# Patient Record
Sex: FEMALE | Race: WHITE | Employment: FULL TIME | URBAN - METROPOLITAN AREA
[De-identification: names, ages, dates, MRNs, and addresses within clinical notes are randomized per-mention and may not be internally consistent; named-entity substitution may affect disease eponyms.]

---

## 2017-04-19 ENCOUNTER — MYC MEDICAL ADVICE (OUTPATIENT)
Dept: FAMILY MEDICINE | Facility: CLINIC | Age: 47
End: 2017-04-19

## 2017-04-19 DIAGNOSIS — F41.1 GENERALIZED ANXIETY DISORDER: Primary | ICD-10-CM

## 2017-04-20 RX ORDER — CITALOPRAM HYDROBROMIDE 20 MG/1
TABLET ORAL
Qty: 90 TABLET | Refills: 1 | Status: SHIPPED | OUTPATIENT
Start: 2017-04-20 | End: 2017-09-12 | Stop reason: ALTCHOICE

## 2017-06-12 DIAGNOSIS — J33.9 NASAL POLYPOSIS: ICD-10-CM

## 2017-06-12 DIAGNOSIS — J32.4 CHRONIC PANSINUSITIS: ICD-10-CM

## 2017-06-12 RX ORDER — AZITHROMYCIN 250 MG/1
TABLET, FILM COATED ORAL
Qty: 6 TABLET | Refills: 3 | Status: SHIPPED | OUTPATIENT
Start: 2017-06-12 | End: 2017-09-12

## 2017-06-12 NOTE — TELEPHONE ENCOUNTER
Pending Prescriptions:                       Disp   Refills    azithromycin (ZITHROMAX) 250 MG tablet    6 tabl*3            Sig: Two tablets first day, then one tablet daily for           four days.

## 2017-09-12 ENCOUNTER — OFFICE VISIT (OUTPATIENT)
Dept: FAMILY MEDICINE | Facility: CLINIC | Age: 47
End: 2017-09-12
Payer: COMMERCIAL

## 2017-09-12 VITALS
DIASTOLIC BLOOD PRESSURE: 83 MMHG | SYSTOLIC BLOOD PRESSURE: 126 MMHG | OXYGEN SATURATION: 100 % | TEMPERATURE: 98 F | HEART RATE: 82 BPM | BODY MASS INDEX: 25.23 KG/M2 | WEIGHT: 147 LBS

## 2017-09-12 DIAGNOSIS — J01.01 ACUTE RECURRENT MAXILLARY SINUSITIS: ICD-10-CM

## 2017-09-12 DIAGNOSIS — R07.0 THROAT PAIN: ICD-10-CM

## 2017-09-12 DIAGNOSIS — F41.1 GENERALIZED ANXIETY DISORDER: Primary | ICD-10-CM

## 2017-09-12 DIAGNOSIS — Z23 NEED FOR PROPHYLACTIC VACCINATION AND INOCULATION AGAINST INFLUENZA: ICD-10-CM

## 2017-09-12 LAB
DEPRECATED S PYO AG THROAT QL EIA: NORMAL
SPECIMEN SOURCE: NORMAL

## 2017-09-12 PROCEDURE — 87081 CULTURE SCREEN ONLY: CPT | Performed by: PHYSICIAN ASSISTANT

## 2017-09-12 PROCEDURE — 90471 IMMUNIZATION ADMIN: CPT | Performed by: PHYSICIAN ASSISTANT

## 2017-09-12 PROCEDURE — 87880 STREP A ASSAY W/OPTIC: CPT | Performed by: PHYSICIAN ASSISTANT

## 2017-09-12 PROCEDURE — 90686 IIV4 VACC NO PRSV 0.5 ML IM: CPT | Performed by: PHYSICIAN ASSISTANT

## 2017-09-12 PROCEDURE — 99213 OFFICE O/P EST LOW 20 MIN: CPT | Mod: 25 | Performed by: PHYSICIAN ASSISTANT

## 2017-09-12 RX ORDER — AZITHROMYCIN 250 MG/1
TABLET, FILM COATED ORAL
Qty: 6 TABLET | Refills: 0 | Status: SHIPPED | OUTPATIENT
Start: 2017-09-12 | End: 2017-12-11

## 2017-09-12 RX ORDER — ATENOLOL 25 MG/1
TABLET ORAL
Qty: 30 TABLET | Refills: 1 | Status: SHIPPED | OUTPATIENT
Start: 2017-09-12 | End: 2017-09-26

## 2017-09-12 ASSESSMENT — ANXIETY QUESTIONNAIRES
1. FEELING NERVOUS, ANXIOUS, OR ON EDGE: NEARLY EVERY DAY
GAD7 TOTAL SCORE: 15
IF YOU CHECKED OFF ANY PROBLEMS ON THIS QUESTIONNAIRE, HOW DIFFICULT HAVE THESE PROBLEMS MADE IT FOR YOU TO DO YOUR WORK, TAKE CARE OF THINGS AT HOME, OR GET ALONG WITH OTHER PEOPLE: VERY DIFFICULT
7. FEELING AFRAID AS IF SOMETHING AWFUL MIGHT HAPPEN: SEVERAL DAYS
2. NOT BEING ABLE TO STOP OR CONTROL WORRYING: SEVERAL DAYS
3. WORRYING TOO MUCH ABOUT DIFFERENT THINGS: MORE THAN HALF THE DAYS
6. BECOMING EASILY ANNOYED OR IRRITABLE: NEARLY EVERY DAY
5. BEING SO RESTLESS THAT IT IS HARD TO SIT STILL: MORE THAN HALF THE DAYS

## 2017-09-12 ASSESSMENT — PATIENT HEALTH QUESTIONNAIRE - PHQ9
5. POOR APPETITE OR OVEREATING: NEARLY EVERY DAY
SUM OF ALL RESPONSES TO PHQ QUESTIONS 1-9: 5

## 2017-09-12 NOTE — Clinical Note
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 Kristen Kehr PA-C

## 2017-09-12 NOTE — MR AVS SNAPSHOT
After Visit Summary   9/12/2017    Annabelle Greene    MRN: [id number]           Patient Information     Date Of Birth          1970        Visit Information        Provider Department      9/12/2017 8:40 AM Kehr, Kristen M, PA-C [facility]        Today's Diagnoses     Throat pain    -  1    Generalized anxiety disorder           Follow-ups after your visit        Who to contact     If you have questions or need follow up information about today's clinic visit or your schedule please contact [facility] directly at [phone].  Normal or non-critical lab and imaging results will be communicated to you by [employer], letter or phone within 4 business days after the clinic has received the results. If you do not hear from us within 7 days, please contact the clinic through Storwizet or phone. If you have a critical or abnormal lab result, we will notify you by phone as soon as possible.  Submit refill requests through Solar[employer] or call your pharmacy and they will forward the refill request to us. Please allow 3 business days for your refill to be completed.          Additional Information About Your Visit        MyChart Information     [employer] gives you secure access to your electronic health record. If you see a primary care provider, you can also send messages to your care team and make appointments. If you have questions, please call your primary care clinic.  If you do not have a primary care provider, please call [phone] and they will assist you.        Care EveryWhere ID     This is your Care EveryWhere ID. This could be used by other organizations to access your [facility] medical records  [id number]        Your Vitals Were     Pulse Temperature Pulse Oximetry BMI (Body Mass Index)          82 98  F (36.7  C) (Oral) 100% 25.23 kg/m2         Blood Pressure from Last 3 Encounters:   09/12/17 126/83   08/26/16 124/85   08/11/16 131/81    Weight from Last 3 Encounters:    09/12/17 147 lb (66.7 kg)   09/13/16 143 lb (64.9 kg)   08/30/16 143 lb (64.9 kg)              We Performed the Following     Beta strep group A culture     Strep, Rapid Screen          Today's Medication Changes          These changes are accurate as of: 9/12/17  9:05 AM.  If you have any questions, ask your nurse or doctor.               Start taking these medicines.        Dose/Directions    sertraline 50 MG tablet   Commonly known as:  ZOLOFT   Used for:  Generalized anxiety disorder   Started by:  Kehr, Kristen M, PA-C        Dose:  50 mg   Take 1 tablet (50 mg) by mouth daily   Quantity:  90 tablet   Refills:  1            Where to get your medicines      These medications were sent to [id number] [address] [place] [place], [facility] [address]     Phone:  [phone]     atenolol 25 MG tablet    sertraline 50 MG tablet                Primary Care Provider Office Phone # Fax #    Kristen M Kehr, PA-C [phone] [address] [facility] [address]        Equal Access to Services     JACKELINE FRASER : Hadii garrett harris hadasho Soomaali, waaxda luqadaha, qaybta kaalmada adeegyada, marek guerra. So [facility] [phone].    ATENCIÓN: Si habla español, tiene a grijalva disposición servicios gratuitos de asistencia lingüística. Llame al [phone].    We comply with applicable federal civil rights laws and [place] laws. We do not discriminate on the basis of race, color, national origin, age, disability sex, sexual orientation or gender identity.            Thank you!     Thank you for choosing [facility]  for your care. Our goal is always to provide you with excellent care. Hearing back from our patients is one way we can continue to improve our services. Please take a few minutes to complete the written survey that you may receive in the mail after your visit with us.  Thank you!             Your Updated Medication List - Protect others around you: Learn how to safely use, store and throw away your medicines at www.disposemymeds.org.          This list is accurate as of: 9/12/17  9:05 AM.  Always use your most recent med list.                   Brand Name Dispense Instructions for use Diagnosis    atenolol 25 MG tablet    TENORMIN    30 tablet    1/2 tablet 15-20 minutes prior to public speaking    Generalized anxiety disorder       citalopram 20 MG tablet    celeXA    90 tablet    Take 1/2 tablet (10 mg) for 1-2 weeks, then increase to 1 tablet orally daily    Generalized anxiety disorder       sertraline 50 MG tablet    ZOLOFT    90 tablet    Take 1 tablet (50 mg) by mouth daily    Generalized anxiety disorder

## 2017-09-12 NOTE — NURSING NOTE
"Chief Complaint   Patient presents with     Anxiety     Sinus Problem     possible sinus infection       Initial /83  Pulse 82  Temp 98  F (36.7  C) (Oral)  Wt 147 lb (66.7 kg)  SpO2 100%  BMI 25.23 kg/m2 Estimated body mass index is 25.23 kg/(m^2) as calculated from the following:    Height as of 9/13/16: 5' 4\" (1.626 m).    Weight as of this encounter: 147 lb (66.7 kg).  Medication Reconciliation: complete    CALEB Daigle MA    "

## 2017-09-12 NOTE — PROGRESS NOTES
SUBJECTIVE:   Annabelle Greene is a 47 year old female who presents to clinic today for the following health issues:    Annabelle is here today for anxiety. She is on the citalopram 20 mg daily and it has not been very effective for her. 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 She is using the atenolol, but it has not always been very effective. She is interested in counseling.     She also has a sinus infection: see below for symptoms.     Anxiety Follow-Up    Status since last visit: Worsened     Other associated symptoms:None    Complicating factors:   Significant life event: Yes-     Current substance abuse: None  Depression symptoms: No  IMER-7 SCORE 4/1/2015 7/2/2015 5/18/2016   Total Score 2 3 -   Total Score - - 5       GAD7      Amount of exercise or physical activity:     Problems taking medications regularly: No    Medication side effects: none  Diet:       ENT Symptoms             Symptoms: cc Present Absent Comment   Fever/Chills   x    Fatigue  x     Muscle Aches  x     Eye Irritation  x     Sneezing  x     Nasal Jenaro/Drg  x     Sinus Pressure/Pain  x     Loss of smell  x     Dental pain  x     Sore Throat  x     Swollen Glands  x     Ear Pain/Fullness  x     Cough  x     Wheeze   x    Chest Pain   x    Shortness of breath   x    Rash   x    Other  x  Headaches      Symptom duration:  off and on x couple of weeks   Symptom severity:  severe   Treatments tried:  otc-dyquil   Contacts:  possible work           Problem list and histories reviewed & adjusted, as indicated.  Additional history: as documented    Patient Active Problem List   Diagnosis     Nasal polyposis     Chronic sinus infection     CARDIOVASCULAR SCREENING; LDL GOAL LESS THAN 160     Generalized anxiety disorder     Past Surgical History:   Procedure Laterality Date     GENITOURINARY SURGERY      c section x 2     HYSTERECTOMY       HYSTERECTOMY, PAP NO LONGER INDICATED       OPTICAL TRACKING SYSTEM  ENDOSCOPIC SINUS SURGERY Bilateral 8/26/2016    Procedure: OPTICAL TRACKING SYSTEM ENDOSCOPIC SINUS SURGERY;  Surgeon: Govind Chester MD;  Location: [facility] OR     SINUS SURGERY      sinus x 4     SURGICAL HISTORY OF -   4/10/08    SINUS SURGERY     TONSILLECTOMY         Social History   Substance Use Topics     Smoking status: Never Smoker     Smokeless tobacco: Never Used     Alcohol use Yes      Comment: occ. 2 drinks a week     Family History   Problem Relation Age of Onset     Hypertension Mother          Allergies   Allergen Reactions     Sulfa Drugs Hives         Reviewed and updated as needed this visit by clinical staffTobacco  Allergies  Meds  Med Hx  Surg Hx  Fam Hx  Soc Hx      Reviewed and updated as needed this visit by Provider         ROS:  Constitutional, HEENT, cardiovascular, pulmonary, gi and gu systems are negative, except as otherwise noted.      OBJECTIVE:   /83  Pulse 82  Temp 98  F (36.7  C) (Oral)  Wt 147 lb (66.7 kg)  SpO2 100%  BMI 25.23 kg/m2  Body mass index is 25.23 kg/(m^2).  GENERAL: healthy, alert and no distress  MS: no gross musculoskeletal defects noted, no edema  SKIN: no suspicious lesions or rashes  PSYCH: mentation appears normal, affect normal/bright, judgement and insight intact and appearance well groomed    Diagnostic Test Results:  none     ASSESSMENT/PLAN:       1. Generalized anxiety disorder  Change to sertraline, stop the cilatopram.   Side effects and how to take the medication discussed.  I will contact Dee here in [facility] to help with scheduling counseling.   Atenolol dose to increase to 1 tablet prior to speaking engagements.   - atenolol (TENORMIN) 25 MG tablet; 1/2 tablet 15-20 minutes prior to public speaking  Dispense: 30 tablet; Refill: 1  - sertraline (ZOLOFT) 50 MG tablet; Take 1 tablet (50 mg) by mouth daily  Dispense: 90 tablet; Refill: 1    2. Acute recurrent maxillary sinusitis  Strep test is negative.    Continue with symptomatic treatments with OTC medications also, rest and fluids.   zpak to treat infection  - azithromycin (ZITHROMAX) 250 MG tablet; Two tablets first day, then one tablet daily for four days.  Dispense: 6 tablet; Refill: 0    3. Need for prophylactic vaccination and inoculation against influenza  - Vaccine Administration, Initial [79423]  - FLU VAC, SPLIT VIRUS IM > 3 YO (QUADRIVALENT) [70934]        Kristen M. Kehr, PA-C  [facility]  Injectable Influenza Immunization Documentation    1.  Are you sick today? (Fever of 100.5 or higher on the day of the clinic)      2.  Have you ever had Guillain-Timberon Syndrome within 6 weeks of an influenza vaccionation?     3. Do you have a life-threatening allergy to eggs?     4. Do you have a life-threatening allergy to a component of the vaccine? May include antibiotics, gelatin or latex.      5. Have you ever had a reaction to a dose of flu vaccine that needed immediate medical attention?      Form completed by Rigoberto SHORT MA

## 2017-09-13 ENCOUNTER — TELEPHONE (OUTPATIENT)
Dept: FAMILY MEDICINE | Facility: CLINIC | Age: 47
End: 2017-09-13

## 2017-09-13 ENCOUNTER — MYC MEDICAL ADVICE (OUTPATIENT)
Dept: FAMILY MEDICINE | Facility: CLINIC | Age: 47
End: 2017-09-13

## 2017-09-13 DIAGNOSIS — F41.1 GENERALIZED ANXIETY DISORDER: ICD-10-CM

## 2017-09-13 DIAGNOSIS — B00.1 RECURRENT COLD SORES: Primary | ICD-10-CM

## 2017-09-13 LAB
BACTERIA SPEC CULT: NORMAL
SPECIMEN SOURCE: NORMAL

## 2017-09-13 ASSESSMENT — ANXIETY QUESTIONNAIRES: GAD7 TOTAL SCORE: 15

## 2017-09-13 NOTE — TELEPHONE ENCOUNTER
Pharmacy note: this medication is currently backordered, with unknown release date. Would you consider change to alternative med (ex. Metoprolol)? If so, please send new Rx. Thanks      Rigoberto SHORT MA

## 2017-09-14 RX ORDER — VALACYCLOVIR HYDROCHLORIDE 500 MG/1
500 TABLET, FILM COATED ORAL 2 TIMES DAILY
Qty: 30 TABLET | Refills: 3 | Status: SHIPPED | OUTPATIENT
Start: 2017-09-14 | End: 2019-03-30

## 2017-09-21 NOTE — TELEPHONE ENCOUNTER
Information below is reviewed with  Kristen Kehr, PA-C, can offer patient Atenolol 25 mg dosing, then can  Atenolol 50 mg tablets at [facility] pharmacy and take 1/2 tablet prior to speaking engagements.  If patient prefers to use her own pharmacy can prescribe Propanolol 20 mg 1 tab 30 minutes prior to speaking engagement.   Left message on answering machine for patient/parent to call back.   [phone].  Stefani Noriega RN

## 2017-09-22 NOTE — TELEPHONE ENCOUNTER
Left message on answering machine for patient/parent to call back.   [phone].  Stefani Noriega RN

## 2017-09-25 ENCOUNTER — TELEPHONE (OUTPATIENT)
Dept: OTOLARYNGOLOGY | Facility: CLINIC | Age: 47
End: 2017-09-25

## 2017-09-25 NOTE — TELEPHONE ENCOUNTER
Left message on answering machine for patient/parent to call back.   [phone].  Stefani Noriega RN

## 2017-09-25 NOTE — TELEPHONE ENCOUNTER
LIZZETTEM requesting that patient call clinic to assist with scheduling. Dr. Chester is only in clinic on 9/26/17 this week due to vacation. He does not have any openings on 9/26 but the other ENT providers have availability to see patient this week.    Shannon Perez, MARIPOSA

## 2017-09-25 NOTE — TELEPHONE ENCOUNTER
Pt would like to be seen this week at either location.  Please call patient to advise.        Thank you,   Nigel BARCENAS   Central Scheduling  [phone]

## 2017-09-26 ENCOUNTER — TELEPHONE (OUTPATIENT)
Dept: BEHAVIORAL HEALTH | Facility: CLINIC | Age: 47
End: 2017-09-26

## 2017-09-26 RX ORDER — ATENOLOL 25 MG/1
TABLET ORAL
Qty: 30 TABLET | Refills: 1 | OUTPATIENT
Start: 2017-09-26

## 2017-09-26 RX ORDER — PROPRANOLOL HYDROCHLORIDE 20 MG/1
TABLET ORAL
Qty: 60 TABLET | Refills: 1 | Status: SHIPPED | OUTPATIENT
Start: 2017-09-26 | End: 2019-03-30

## 2017-09-26 NOTE — TELEPHONE ENCOUNTER
Patient returned call, patient prefers to try the Propranolol.  New prescription is sent to pharmacy with instructions.   The patient/parent agrees with the plan and verbalized good understanding.    Per protocol, will route encounter to be cosigned by provider for Verbal Orders.  Stefani Noriega RN

## 2017-09-26 NOTE — TELEPHONE ENCOUNTER
Phone Encounter   Left message introducing [facility] and therapy services urged patient to call clinic if interested in additional support services.

## 2017-09-26 NOTE — TELEPHONE ENCOUNTER
Patient has not returned call after multiple attempts.  Denial sent to pharmacy to have patient call clinic to determine plan for alternate medication.    Per protocol, will route encounter to be cosigned by provider for Verbal Orders.  Stefani Noriega RN

## 2017-09-29 ENCOUNTER — OFFICE VISIT (OUTPATIENT)
Dept: OTOLARYNGOLOGY | Facility: CLINIC | Age: 47
End: 2017-09-29
Payer: COMMERCIAL

## 2017-09-29 VITALS — RESPIRATION RATE: 14 BRPM | WEIGHT: 147 LBS | HEIGHT: 64 IN | BODY MASS INDEX: 25.1 KG/M2

## 2017-09-29 DIAGNOSIS — J32.4 CHRONIC PANSINUSITIS: Primary | ICD-10-CM

## 2017-09-29 DIAGNOSIS — J33.9 NASAL POLYP: ICD-10-CM

## 2017-09-29 PROCEDURE — 99214 OFFICE O/P EST MOD 30 MIN: CPT | Mod: 25 | Performed by: OTOLARYNGOLOGY

## 2017-09-29 PROCEDURE — 31231 NASAL ENDOSCOPY DX: CPT | Performed by: OTOLARYNGOLOGY

## 2017-09-29 RX ORDER — PREDNISONE 20 MG/1
TABLET ORAL
Qty: 20 TABLET | Refills: 0 | Status: SHIPPED | OUTPATIENT
Start: 2017-09-29 | End: 2018-02-27

## 2017-09-29 RX ORDER — FLUTICASONE PROPIONATE 50 MCG
2 SPRAY, SUSPENSION (ML) NASAL DAILY
Qty: 1 BOTTLE | Refills: 11 | Status: SHIPPED | OUTPATIENT
Start: 2017-09-29 | End: 2018-02-27

## 2017-09-29 NOTE — NURSING NOTE
"Chief Complaint   Patient presents with     Consult     Sinus       Initial Resp 14  Ht 1.626 m (5' 4\")  Wt 66.7 kg (147 lb)  BMI 25.23 kg/m2 Estimated body mass index is 25.23 kg/(m^2) as calculated from the following:    Height as of this encounter: 1.626 m (5' 4\").    Weight as of this encounter: 66.7 kg (147 lb).  Medication Reconciliation: complete   Juli Pineda CMA 9/29/2017 1:08 PM      "

## 2017-09-29 NOTE — MR AVS SNAPSHOT
After Visit Summary   9/29/2017    Annabelle Greene    MRN: [id number]           Patient Information     Date Of Birth          1970        Visit Information        Provider Department      9/29/2017 1:00 PM Gulshan Crockett MD [facility]        Today's Diagnoses     Chronic pansinusitis    -  1    Nasal polyp          Care Instructions    General Scheduling Information  To schedule your CT/MRI scan, please contact [place] [facility] at [phone] [address] [place] [address]    To schedule your Surgery, please contact our Specialty Schedulers at [phone]    ENT Clinic Locations Clinic Hours Telephone Number     [facility]  [address]  [place] [address]   Tuesday:       8:00am -- 4:00pm    Wednesday:  8:00am - 4:00pm   To schedule an appointment with   Dr. Crockett,   please contact our   Specialty Scheduling Department at:     [phone]       [facility]  [address] [place] [address]   Friday:          8:00am - 4:00pm         Urgent Care Locations Clinic Hours Telephone Numbers     [facility]  [address]  [place] [address]     Monday-Friday:     11:00pm - 9:00pm    Saturday-Sunday:  9:00am - 5:00pm   [phone]     [facility]  [address]   [place] [address]     Monday-Friday:      5:00pm - 9:00pm     Saturday-Sunday:  9:00am - 5:00pm   [phone]               Follow-ups after your visit        Who to contact     If you have questions or need follow up information about today's clinic visit or your schedule please contact [facility] directly at [phone].  Normal or non-critical lab and imaging results will be communicated to you by MyChart, letter or phone within 4 business days after the clinic has received the results. If you do not hear from us within 7 days, please contact the clinic through MyChart or phone. If you have a critical or abnormal lab result, we  "will notify you by phone as soon as possible.  Submit refill requests through [employer] or call your pharmacy and they will forward the refill request to us. Please allow 3 business days for your refill to be completed.          Additional Information About Your Visit        Secur[employer]hart Information     [employer] gives you secure access to your electronic health record. If you see a primary care provider, you can also send messages to your care team and make appointments. If you have questions, please call your primary care clinic.  If you do not have a primary care provider, please call [phone] and they will assist you.        Care EveryWhere ID     This is your Care EveryWhere ID. This could be used by other organizations to access your [place] medical records  [id number]        Your Vitals Were     Respirations Height BMI (Body Mass Index)             14 1.626 m (5' 4\") 25.23 kg/m2          Blood Pressure from Last 3 Encounters:   09/12/17 126/83   08/26/16 124/85   08/11/16 131/81    Weight from Last 3 Encounters:   09/29/17 66.7 kg (147 lb)   09/12/17 66.7 kg (147 lb)   09/13/16 64.9 kg (143 lb)              We Performed the Following     Nasal Endoscopy, Diag          Today's Medication Changes          These changes are accurate as of: 9/29/17  5:44 PM.  If you have any questions, ask your nurse or doctor.               Start taking these medicines.        Dose/Directions    fluticasone 50 MCG/ACT spray   Commonly known as:  FLONASE   Used for:  Chronic pansinusitis, Nasal polyp   Started by:  Gulshan Crockett MD        Dose:  2 spray   Spray 2 sprays into both nostrils daily   Quantity:  1 Bottle   Refills:  11       predniSONE 20 MG tablet   Commonly known as:  DELTASONE   Used for:  Chronic pansinusitis, Nasal polyp   Started by:  Gulshan Crockett MD        Take 60 mg daily for 3 days, 40 mg daily for 3 days, 20 mg daily for 3 days, 10 mg daily for 3 days   Quantity:  20 tablet   Refills:  0          "   Where to get your medicines      These medications were sent to [id number] - [facility] [address] AT [address]  [address], [facility] [address]     Phone:  [phone]     fluticasone 50 MCG/ACT spray    predniSONE 20 MG tablet                Primary Care Provider Office Phone # Fax #    Kristen M Kehr, PA-C [phone] [address] [facility] [address]        Equal Access to Services     [facility]: Hadii aad ku hadasho Soomaali, waaxda luqadaha, qaybta kaalmada adeegyada, waxcarmina martel hayjuliette waller . So [facility] [place] [phone].    ATENCIÓN: Si habla español, tiene a grijalva disposición servicios gratuitos de asistencia lingüística. [facility] [phone].    We comply with applicable federal civil rights laws and [place] laws. We do not discriminate on the basis of race, color, national origin, age, disability, sex, sexual orientation, or gender identity.            Thank you!     Thank you for choosing [facility]  for your care. Our goal is always to provide you with excellent care. Hearing back from our patients is one way we can continue to improve our services. Please take a few minutes to complete the written survey that you may receive in the mail after your visit with us. Thank you!             Your Updated Medication List - Protect others around you: Learn how to safely use, store and throw away your medicines at www.disposemymeds.org.          This list is accurate as of: 9/29/17  5:44 PM.  Always use your most recent med list.                   Brand Name Dispense Instructions for use Diagnosis    azithromycin 250 MG tablet    ZITHROMAX    6 tablet    Two tablets first day, then one tablet daily for four days.    Acute recurrent maxillary sinusitis       fluticasone 50 MCG/ACT spray    FLONASE    1 Bottle    Spray 2 sprays into both nostrils daily    Chronic pansinusitis, Nasal polyp        predniSONE 20 MG tablet    DELTASONE    20 tablet    Take 60 mg daily for 3 days, 40 mg daily for 3 days, 20 mg daily for 3 days, 10 mg daily for 3 days    Chronic pansinusitis, Nasal polyp       propranolol 20 MG tablet    INDERAL    60 tablet    Take 1 tablet 30 min prior to anxiety provoking event    Generalized anxiety disorder       sertraline 50 MG tablet    ZOLOFT    90 tablet    Take 1 tablet (50 mg) by mouth daily    Generalized anxiety disorder       valACYclovir 500 MG tablet    VALTREX    30 tablet    Take 1 tablet (500 mg) by mouth 2 times daily    Recurrent cold sores

## 2017-09-29 NOTE — PATIENT INSTRUCTIONS
General Scheduling Information  To schedule your CT/MRI scan, please contact [place] [facility] at [phone]   [address] [place] [address]    To schedule your Surgery, please contact our Specialty Schedulers at [phone]    ENT Clinic Locations Clinic Hours Telephone Number     [facility]  [address] [place] [address]   Tuesday:       8:00am -- 4:00pm    Wednesday:  8:00am - 4:00pm   To schedule an appointment with   Dr. Crockett,   please contact our   Specialty Scheduling Department at:     [phone]       [facility]  [address] [place] [address]   Friday:          8:00am - 4:00pm         Urgent Care Locations Clinic Hours Telephone Numbers     [facility]  [address] [place] [address]     Monday-Friday:     11:00pm - 9:00pm    Saturday-Sunday:  9:00am - 5:00pm   [phone]     [facility]  [address] [place] [address]     Monday-Friday:      5:00pm - 9:00pm     Saturday-Sunday:  9:00am - 5:00pm   [phone]

## 2017-09-29 NOTE — PROGRESS NOTES
Chief Complaint - sinusitis    History of Present Illness - Annabelle Greene is a 47 year old female who presents for evaluation of chronic sinusitis. Patient of Dr. Hagen Has had sinus surgery in the past, most recently 8/26/2016. The patient describes symptoms of forehead pain, yellow and green drainage for the past 1-2 months. Treatments within the last few months have included z-pac, nasal irrigations, and oral antihistamines. Headache bothers her the most. Poor sense of smell. Principal at [employer].     Past Medical History -   Patient Active Problem List   Diagnosis     Nasal polyposis     Chronic sinus infection     CARDIOVASCULAR SCREENING; LDL GOAL LESS THAN 160     Generalized anxiety disorder       Current Medications -   Current Outpatient Prescriptions:      propranolol (INDERAL) 20 MG tablet, Take 1 tablet 30 min prior to anxiety provoking event, Disp: 60 tablet, Rfl: 1     valACYclovir (VALTREX) 500 MG tablet, Take 1 tablet (500 mg) by mouth 2 times daily, Disp: 30 tablet, Rfl: 3     sertraline (ZOLOFT) 50 MG tablet, Take 1 tablet (50 mg) by mouth daily, Disp: 90 tablet, Rfl: 1     azithromycin (ZITHROMAX) 250 MG tablet, Two tablets first day, then one tablet daily for four days., Disp: 6 tablet, Rfl: 0    Allergies -   Allergies   Allergen Reactions     Sulfa Drugs Hives       Social History -   Social History     Social History     Marital status:      Spouse name: N/A     Number of children: N/A     Years of education: N/A     Occupational History      [employer]     Social History Main Topics     Smoking status: Never Smoker     Smokeless tobacco: Never Used     Alcohol use Yes      Comment: occ. 2 drinks a week     Drug use: No     Sexual activity: Yes     Partners: Male     Birth control/ protection: IUD     Other Topics Concern     None     Social History Narrative       Family History -   Family History   Problem Relation Age of Onset     Hypertension  "Mother        Review of Systems - As per HPI and PMHx, otherwise 7 system review of the head and neck negative.    Physical Exam  Resp 14  Ht 1.626 m (5' 4\")  Wt 66.7 kg (147 lb)  BMI 25.23 kg/m2  General - The patient is nontoxic, in no distress. Alert and oriented to person and place, answers questions and cooperates with examination appropriately.   Neurologic - CN II-XII are intact. No focal neurologic deficits.   Voice and Breathing - The patient was breathing comfortably without the use of accessory muscles. There was no wheezing, stridor, or stertor.  The patients voice was clear and strong.  Eyes - Extraocular movements intact.  Sclera were not icteric or injected, conjunctiva were pink and moist.  Mouth - Examination of the oral cavity showed pink, healthy oral mucosa. No lesions or ulcerations noted.  The tongue was mobile and midline.  Throat - The walls of the oropharynx were smooth, symmetric, and had no lesions or ulcerations.  No postnasal drainage.  The uvula was midline on elevation.  Nose - External contour is symmetric, no gross deflection or scars.  Nasal mucosa is pink and moist with no abnormal mucus.  The septum was midline and non-obstructive, no pus. Ethmoids open.   Neck - Palpation of the occipital, submental, submandibular, internal jugular chain, and supraclavicular nodes did not demonstrate any abnormal lymph nodes or masses. No parotid masses. Palpation of the thyroid was soft and smooth, with no nodules or goiter appreciated.  The trachea was mobile and midline.    Performed flexible nasal endoscopy with peds flex scope. Sprayed both nasal cavities with phenylephrine and lidocaine. Examined the right nasal cavity with scope. Open ethmoids and maxillary. Mild polypoid changes up high by frontal outflow, but no pus. Maxillary had no pus, some edema. Sphenoid open and clear. Middle turbinate partially removed. Left side shows open frontal and maxillary and ethmoids. No pus and no " polyps. Septum straight.       A/P - Annabelle VANESSA Greene is a 47 year old female with chronic sinusitis.. Has been having headaches and pressure. Could be her right frontal closed off as she has high polypoid changes. No pus. Overall the sinuses looked good. I advised prednisone and flonase and nasal saline irrigations.       Gulshan Crockett MD  Otolaryngology  [facility]

## 2017-10-17 ENCOUNTER — RADIANT APPOINTMENT (OUTPATIENT)
Dept: MRI IMAGING | Facility: CLINIC | Age: 47
End: 2017-10-17
Attending: PHYSICIAN ASSISTANT
Payer: COMMERCIAL

## 2017-10-17 DIAGNOSIS — M54.12 CERVICAL RADICULOPATHY: ICD-10-CM

## 2017-10-17 PROCEDURE — 72141 MRI NECK SPINE W/O DYE: CPT | Mod: TC

## 2017-10-30 DIAGNOSIS — F41.1 GENERALIZED ANXIETY DISORDER: ICD-10-CM

## 2017-10-31 RX ORDER — CITALOPRAM HYDROBROMIDE 20 MG/1
TABLET ORAL
OUTPATIENT
Start: 2017-10-31

## 2017-12-11 DIAGNOSIS — J01.01 ACUTE RECURRENT MAXILLARY SINUSITIS: ICD-10-CM

## 2017-12-11 RX ORDER — AZITHROMYCIN 250 MG/1
TABLET, FILM COATED ORAL
Qty: 6 TABLET | Refills: 0 | Status: SHIPPED | OUTPATIENT
Start: 2017-12-11 | End: 2018-02-27

## 2017-12-11 NOTE — TELEPHONE ENCOUNTER
Routing refill request to provider for review/approval because:  Drug not on the [facility] refill protocol       Rajwinder Diana RN - BC

## 2018-02-27 ENCOUNTER — OFFICE VISIT (OUTPATIENT)
Dept: FAMILY MEDICINE | Facility: CLINIC | Age: 48
End: 2018-02-27
Payer: COMMERCIAL

## 2018-02-27 VITALS
HEIGHT: 63 IN | OXYGEN SATURATION: 99 % | HEART RATE: 75 BPM | DIASTOLIC BLOOD PRESSURE: 70 MMHG | RESPIRATION RATE: 14 BRPM | BODY MASS INDEX: 26.58 KG/M2 | SYSTOLIC BLOOD PRESSURE: 109 MMHG | WEIGHT: 150 LBS | TEMPERATURE: 97.4 F

## 2018-02-27 DIAGNOSIS — Z13.1 SCREENING FOR DIABETES MELLITUS: ICD-10-CM

## 2018-02-27 DIAGNOSIS — Z13.220 SCREENING, LIPID: ICD-10-CM

## 2018-02-27 DIAGNOSIS — Z12.31 VISIT FOR SCREENING MAMMOGRAM: ICD-10-CM

## 2018-02-27 DIAGNOSIS — Z00.00 ROUTINE GENERAL MEDICAL EXAMINATION AT A HEALTH CARE FACILITY: Primary | ICD-10-CM

## 2018-02-27 DIAGNOSIS — G44.209 TENSION HEADACHE: ICD-10-CM

## 2018-02-27 DIAGNOSIS — T75.3XXA MOTION SICKNESS, INITIAL ENCOUNTER: ICD-10-CM

## 2018-02-27 LAB
ESTRADIOL SERPL-MCNC: 115 PG/ML
TSH SERPL DL<=0.005 MIU/L-ACNC: 1.14 MU/L (ref 0.4–4)

## 2018-02-27 PROCEDURE — 82670 ASSAY OF TOTAL ESTRADIOL: CPT | Performed by: PHYSICIAN ASSISTANT

## 2018-02-27 PROCEDURE — 36415 COLL VENOUS BLD VENIPUNCTURE: CPT | Performed by: PHYSICIAN ASSISTANT

## 2018-02-27 PROCEDURE — 99396 PREV VISIT EST AGE 40-64: CPT | Performed by: PHYSICIAN ASSISTANT

## 2018-02-27 PROCEDURE — 84443 ASSAY THYROID STIM HORMONE: CPT | Performed by: PHYSICIAN ASSISTANT

## 2018-02-27 RX ORDER — SCOLOPAMINE TRANSDERMAL SYSTEM 1 MG/1
PATCH, EXTENDED RELEASE TRANSDERMAL
Qty: 2 PATCH | Refills: 3 | Status: SHIPPED | OUTPATIENT
Start: 2018-02-27 | End: 2021-04-02

## 2018-02-27 NOTE — NURSING NOTE
"Chief Complaint   Patient presents with     Physical       Initial /70  Pulse 75  Temp 97.4  F (36.3  C) (Oral)  Resp 14  Ht 5' 3\" (1.6 m)  Wt 150 lb (68 kg)  SpO2 99%  BMI 26.57 kg/m2 Estimated body mass index is 26.57 kg/(m^2) as calculated from the following:    Height as of this encounter: 5' 3\" (1.6 m).    Weight as of this encounter: 150 lb (68 kg).  Medication Reconciliation: complete    CALEB Daigle MA    "

## 2018-02-27 NOTE — MR AVS SNAPSHOT
After Visit Summary   2/27/2018    Annabelle Greene    MRN: [id number]           Patient Information     Date Of Birth          1970        Visit Information        Provider Department      2/27/2018 8:20 AM Kehr, Kristen M, PA-C [facility]        Today's Diagnoses     Routine general medical examination at a health care facility    -  1    Visit for screening mammogram        Screening, lipid        Screening for diabetes mellitus        Motion sickness, initial encounter        Tension headache          Care Instructions      Preventive Health Recommendations  Female Ages 40 to 49    Yearly exam:     See your health care provider every year in order to  1. Review health changes.   2. Discuss preventive care.    3. Review your medicines if your doctor prescribed any.      Get a Pap test every three years (unless you have an abnormal result and your provider advises testing more often).      If you get Pap tests with HPV test, you only need to test every 5 years, unless you have an abnormal result. You do not need a Pap test if your uterus was removed (hysterectomy) and you have not had cancer.      You should be tested each year for STDs (sexually transmitted diseases), if you're at risk.       Ask your doctor if you should have a mammogram.      Have a colonoscopy (test for colon cancer) if someone in your family has had colon cancer or polyps before age 50.       Have a cholesterol test every 5 years.       Have a diabetes test (fasting glucose) after age 45. If you are at risk for diabetes, you should have this test every 3 years.    Shots: Get a flu shot each year. Get a tetanus shot every 10 years.     Nutrition:     Eat at least 5 servings of fruits and vegetables each day.    Eat whole-grain bread, whole-wheat pasta and brown rice instead of white grains and rice.    Talk to your provider about Calcium and Vitamin D.     Lifestyle    Exercise at least 150 minutes a week (an  average of 30 minutes a day, 5 days a week). This will help you control your weight and prevent disease.    Limit alcohol to one drink per day.    No smoking.     Wear sunscreen to prevent skin cancer.    See your dentist every six months for an exam and cleaning.      Schedule fasting lab appointment and mammogram          Follow-ups after your visit        Future tests that were ordered for you today     Open Future Orders        Priority Expected Expires Ordered    Lipid panel reflex to direct LDL Fasting Routine 3/23/2018 11/27/2018 2/27/2018    *MA Screening Digital Bilateral Routine  2/27/2019 2/27/2018    GLUCOSE Routine 3/23/2018 11/27/2018 2/27/2018            Who to contact     If you have questions or need follow up information about today's clinic visit or your schedule please contact [facility] directly at [phone].  Normal or non-critical lab and imaging results will be communicated to you by Allmyappshart, letter or phone within 4 business days after the clinic has received the results. If you do not hear from us within 7 days, please contact the clinic through Allmyappshart or phone. If you have a critical or abnormal lab result, we will notify you by phone as soon as possible.  Submit refill requests through PageStitch or call your pharmacy and they will forward the refill request to us. Please allow 3 business days for your refill to be completed.          Additional Information About Your Visit        [employer] Information     PageStitch gives you secure access to your electronic health record. If you see a primary care provider, you can also send messages to your care team and make appointments. If you have questions, please call your primary care clinic.  If you do not have a primary care provider, please call [phone] and they will assist you.        Care EveryWhere ID     This is your Care EveryWhere ID. This could be used by other organizations to access your [facility]  "records  [id number]        Your Vitals Were     Pulse Temperature Respirations Height Pulse Oximetry BMI (Body Mass Index)    75 97.4  F (36.3  C) (Oral) 14 5' 3\" (1.6 m) 99% 26.57 kg/m2       Blood Pressure from Last 3 Encounters:   02/27/18 109/70   09/12/17 126/83   08/26/16 124/85    Weight from Last 3 Encounters:   02/27/18 150 lb (68 kg)   09/29/17 147 lb (66.7 kg)   09/12/17 147 lb (66.7 kg)              We Performed the Following     Estradiol     TSH with free T4 reflex          Today's Medication Changes          These changes are accurate as of 2/27/18  8:57 AM.  If you have any questions, ask your nurse or doctor.               Start taking these medicines.        Dose/Directions    scopolamine 72 hr patch   Commonly known as:  TRANSDERM   Used for:  Motion sickness, initial encounter   Started by:  Kehr, Kristen M, PA-C        Apply 1 patch to hairless area behind one ear at least 4 hours before travel.  Remove old patch and change every 3 days (72 hours).   Quantity:  2 patch   Refills:  3            Where to get your medicines      These medications were sent to [id number] - JO[address]NE SMART [address] [place] AT [address] [place], [facility] [address]     Phone:  [phone]     scopolamine 72 hr patch                Primary Care Provider Office Phone # Fax #    Kristen M Kehr, PA-C [phone] [address] [facility] [address]        Equal Access to Services     [facility] BRIA AH: Hadii garrett franz Sodavid, waaxda luqadaha, qaybta kaalmada alex, marek guerra. So [facility] [phone].    ATENCIÓN: Si habla español, tiene a grijalva disposición servicios gratuitos de asistencia lingüística. Llame al [phone].    We comply with applicable federal civil rights laws and [place] laws. We do not discriminate on the basis of race, color, national origin, age, disability, sex, sexual " orientation, or gender identity.            Thank you!     Thank you for choosing [facility]  [facility]. Our goal is always to provide you with excellent care. Hearing back from our patients is one way we can continue to improve our services. Please take a few minutes to complete the written survey that you may receive in the mail after your visit with us. Thank you!             Your Updated Medication List - Protect others around you: Learn how to safely use, store and throw away your medicines at www.disposemymeds.org.          This list is accurate as of 2/27/18  8:57 AM.  Always use your most recent med list.                   Brand Name Dispense Instructions for use Diagnosis    propranolol 20 MG tablet    INDERAL    60 tablet    Take 1 tablet 30 min prior to anxiety provoking event    Generalized anxiety disorder       scopolamine 72 hr patch    TRANSDERM    2 patch    Apply 1 patch to hairless area behind one ear at least 4 hours before travel.  Remove old patch and change every 3 days (72 hours).    Motion sickness, initial encounter       valACYclovir 500 MG tablet    VALTREX    30 tablet    Take 1 tablet (500 mg) by mouth 2 times daily    Recurrent cold sores

## 2018-02-27 NOTE — PATIENT INSTRUCTIONS
Preventive Health Recommendations  Female Ages 40 to 49    Yearly exam:     See your health care provider every year in order to  1. Review health changes.   2. Discuss preventive care.    3. Review your medicines if your doctor prescribed any.      Get a Pap test every three years (unless you have an abnormal result and your provider advises testing more often).      If you get Pap tests with HPV test, you only need to test every 5 years, unless you have an abnormal result. You do not need a Pap test if your uterus was removed (hysterectomy) and you have not had cancer.      You should be tested each year for STDs (sexually transmitted diseases), if you're at risk.       Ask your doctor if you should have a mammogram.      Have a colonoscopy (test for colon cancer) if someone in your family has had colon cancer or polyps before age 50.       Have a cholesterol test every 5 years.       Have a diabetes test (fasting glucose) after age 45. If you are at risk for diabetes, you should have this test every 3 years.    Shots: Get a flu shot each year. Get a tetanus shot every 10 years.     Nutrition:     Eat at least 5 servings of fruits and vegetables each day.    Eat whole-grain bread, whole-wheat pasta and brown rice instead of white grains and rice.    Talk to your provider about Calcium and Vitamin D.     Lifestyle    Exercise at least 150 minutes a week (an average of 30 minutes a day, 5 days a week). This will help you control your weight and prevent disease.    Limit alcohol to one drink per day.    No smoking.     Wear sunscreen to prevent skin cancer.    See your dentist every six months for an exam and cleaning.      Schedule fasting lab appointment and mammogram

## 2018-02-27 NOTE — PROGRESS NOTES
SUBJECTIVE:   CC: Annabelle Greene is an 47 year old woman who presents for preventive health visit.     Physical   Annual:     Getting at least 3 servings of Calcium per day::  Yes    Bi-annual eye exam::  NO    Dental care twice a year::  Yes    Sleep apnea or symptoms of sleep apnea::  None    Diet::  Regular (no restrictions)    Frequency of exercise::  4-5 days/week    Duration of exercise::  45-60 minutes    Taking medications regularly::  Yes    Medication side effects::  Other    Additional concerns today::  No            Discuss headaches, check thyroid, and discuss medication.  She stopped taking the sertraline a few weeks ago and feels that she is doing well. She has the propranolol as needed.   She has developed headaches / they are dull and she often has to lay down for a few minutes and they will go away with rest or ibuprofen. She has them a few times / week. She has also noticed some weight gain and hot flashes. She had a hysterectomy a few years ago and still has her ovaries.     Today's PHQ-2 Score:   PHQ-2 ( 1999 [employer]) 2/27/2018   Q1: Little interest or pleasure in doing things 0   Q2: Feeling down, depressed or hopeless 0   PHQ-2 Score 0   Q1: Little interest or pleasure in doing things Not at all   Q2: Feeling down, depressed or hopeless Not at all   PHQ-2 Score 0       Abuse: Current or Past(Physical, Sexual or Emotional)- No  Do you feel safe in your environment - Yes    Social History   Substance Use Topics     Smoking status: Never Smoker     Smokeless tobacco: Never Used     Alcohol use Yes      Comment: occ. 2 drinks a week     Alcohol Use 2/27/2018   If you drink alcohol, do you typically have greater than 3 drinks per day OR greater than 7 drinks per week?   No   No flowsheet data found.    Reviewed orders with patient.  Reviewed health maintenance and updated orders accordingly - Yes  BP Readings from Last 3 Encounters:   02/27/18 109/70   09/12/17 126/83   08/26/16 124/85    Wt  Readings from Last 3 Encounters:   02/27/18 150 lb (68 kg)   09/29/17 147 lb (66.7 kg)   09/12/17 147 lb (66.7 kg)                  Patient Active Problem List   Diagnosis     Nasal polyposis     Chronic sinus infection     CARDIOVASCULAR SCREENING; LDL GOAL LESS THAN 160     Generalized anxiety disorder     Past Surgical History:   Procedure Laterality Date     GENITOURINARY SURGERY      c section x 2     HYSTERECTOMY       HYSTERECTOMY, PAP NO LONGER INDICATED       OPTICAL TRACKING SYSTEM ENDOSCOPIC SINUS SURGERY Bilateral 8/26/2016    Procedure: OPTICAL TRACKING SYSTEM ENDOSCOPIC SINUS SURGERY;  Surgeon: Govind Chester MD;  Location: [facility] OR     SINUS SURGERY      sinus x 4     SURGICAL HISTORY OF -   4/10/08    SINUS SURGERY     TONSILLECTOMY         Social History   Substance Use Topics     Smoking status: Never Smoker     Smokeless tobacco: Never Used     Alcohol use Yes      Comment: occ. 2 drinks a week     Family History   Problem Relation Age of Onset     Hypertension Mother          Current Outpatient Prescriptions   Medication Sig Dispense Refill     scopolamine (TRANSDERM) 72 hr patch Apply 1 patch to hairless area behind one ear at least 4 hours before travel.  Remove old patch and change every 3 days (72 hours). 2 patch 3     propranolol (INDERAL) 20 MG tablet Take 1 tablet 30 min prior to anxiety provoking event 60 tablet 1     valACYclovir (VALTREX) 500 MG tablet Take 1 tablet (500 mg) by mouth 2 times daily 30 tablet 3       Patient under age 50, mutual decision reflected in health maintenance.      Pertinent mammograms are reviewed under the imaging tab.  History of abnormal Pap smear: Status post benign hysterectomy. Health Maintenance and Surgical History updated.    Reviewed and updated as needed this visit by clinical staff  Tobacco  Allergies  Meds  Med Hx  Surg Hx  Fam Hx  Soc Hx        Reviewed and updated as needed this visit by Provider        Past Medical History:   Diagnosis  "Date     Chronic sinusitis       Past Surgical History:   Procedure Laterality Date     GENITOURINARY SURGERY      c section x 2     HYSTERECTOMY       HYSTERECTOMY, PAP NO LONGER INDICATED       OPTICAL TRACKING SYSTEM ENDOSCOPIC SINUS SURGERY Bilateral 8/26/2016    Procedure: OPTICAL TRACKING SYSTEM ENDOSCOPIC SINUS SURGERY;  Surgeon: Govind Chester MD;  Location: [facility] OR     SINUS SURGERY      sinus x 4     SURGICAL HISTORY OF -   4/10/08    SINUS SURGERY     TONSILLECTOMY         Review of Systems  C: NEGATIVE for fever, chills, she has had some weight gain  I: NEGATIVE for worrisome rashes, moles or lesions  E: NEGATIVE for vision changes or irritation  ENT: NEGATIVE for ear, mouth and throat problems  R: NEGATIVE for significant cough or SOB  B: NEGATIVE for masses, tenderness or discharge  CV: NEGATIVE for chest pain, palpitations or peripheral edema  GI: NEGATIVE for nausea, abdominal pain, heartburn, or change in bowel habits  : NEGATIVE for unusual urinary or vaginal symptoms. No vaginal bleeding.  M: NEGATIVE for significant arthralgias or myalgia  N: NEGATIVE for weakness, dizziness or paresthesias, headaches as above.   E: NEGATIVE for temperature intolerance, skin/hair changes, weight gain.   P: NEGATIVE for changes in mood or affect, improvement with anxiety     OBJECTIVE:   /70  Pulse 75  Temp 97.4  F (36.3  C) (Oral)  Resp 14  Ht 5' 3\" (1.6 m)  Wt 150 lb (68 kg)  SpO2 99%  BMI 26.57 kg/m2  Physical Exam  GENERAL APPEARANCE: healthy, alert and no distress  EYES: Eyes grossly normal to inspection, PERRL and conjunctivae and sclerae normal  HENT: ear canals and TM's normal, nose and mouth without ulcers or lesions, oropharynx clear and oral mucous membranes moist  NECK: no adenopathy, no asymmetry, masses, or scars and thyroid normal to palpation  RESP: lungs clear to auscultation - no rales, rhonchi or wheezes  BREAST: normal without masses, tenderness or nipple discharge and no " "palpable axillary masses or adenopathy  CV: regular rate and rhythm, normal S1 S2, no S3 or S4, no murmur, click or rub, no peripheral edema and peripheral pulses strong  ABDOMEN: soft, nontender, no hepatosplenomegaly, no masses and bowel sounds normal  MS: no musculoskeletal defects are noted and gait is age appropriate without ataxia  SKIN: no suspicious lesions or rashes  NEURO: Normal strength and tone, sensory exam grossly normal, mentation intact and speech normal  PSYCH: mentation appears normal and affect normal/bright    ASSESSMENT/PLAN:   1. Routine general medical examination at a health care facility  Health maintenance reviewed and updated.    2. Visit for screening mammogram  - *MA Screening Digital Bilateral; Future    3. Screening, lipid  - Lipid panel reflex to direct LDL Fasting; Future    4. Screening for diabetes mellitus  - GLUCOSE; Future    5. Motion sickness, initial encounter  Upcoming travel.   - scopolamine (TRANSDERM) 72 hr patch; Apply 1 patch to hairless area behind one ear at least 4 hours before travel.  Remove old patch and change every 3 days (72 hours).  Dispense: 2 patch; Refill: 3    6. Tension headache  Check the following today.   She has an appointment to have her eyes checked.   Stress reduction. Increase water intake.   Work on getting adequate rest.   She will notify if symptoms persist.   May be associated with getting off of the sertraline also.   - TSH with free T4 reflex  - Estradiol    COUNSELING:  Reviewed preventive health counseling, as reflected in patient instructions       Regular exercise       Healthy diet/nutrition       reports that she has never smoked. She has never used smokeless tobacco.    Estimated body mass index is 26.57 kg/(m^2) as calculated from the following:    Height as of this encounter: 5' 3\" (1.6 m).    Weight as of this encounter: 150 lb (68 kg).   Weight management plan: Discussed healthy diet and exercise guidelines and patient will " follow up in 12 months in clinic to re-evaluate.    Counseling Resources:  ATP IV Guidelines  Pooled Cohorts Equation Calculator  Breast Cancer Risk Calculator  FRAX Risk Assessment  ICSI Preventive Guidelines  Dietary Guidelines for Americans, 2010  USDA's MyPlate  ASA Prophylaxis  Lung CA Screening    Kristen M. Kehr, PA-C  [facility]

## 2018-03-01 DIAGNOSIS — F41.1 GENERALIZED ANXIETY DISORDER: ICD-10-CM

## 2018-03-02 NOTE — TELEPHONE ENCOUNTER
Please contact this patient. I just saw her this week and she was not going to go back on this medication. I think it is an automatic refill from the pharmacy unless Annabelle has decided to go back on this medication.   If she decided to go back on it, please send the refill.   Kristen Kehr PA-C

## 2018-03-02 NOTE — TELEPHONE ENCOUNTER
Routing refill request to provider for review/approval because:  Drug not active on patient's medication list    Yaneli Benítez RN

## 2018-03-05 NOTE — TELEPHONE ENCOUNTER
Left message on answering machine for patient/parent to call back.   [phone].  Stefani Noriega RN

## 2018-03-06 NOTE — TELEPHONE ENCOUNTER
Patient responded, she is no longer taking this medication.    Denial sent to pharmacy.  Med list is updated.  Stefani Noriega RN

## 2018-03-06 NOTE — TELEPHONE ENCOUNTER
Left message on answering machine for patient/parent to call back.   [phone].  Stefani Noriega RN

## 2018-04-06 ENCOUNTER — RADIANT APPOINTMENT (OUTPATIENT)
Dept: MAMMOGRAPHY | Facility: CLINIC | Age: 48
End: 2018-04-06
Attending: PHYSICIAN ASSISTANT
Payer: COMMERCIAL

## 2018-04-06 DIAGNOSIS — Z12.31 VISIT FOR SCREENING MAMMOGRAM: ICD-10-CM

## 2018-04-06 DIAGNOSIS — Z13.220 SCREENING, LIPID: ICD-10-CM

## 2018-04-06 DIAGNOSIS — Z13.1 SCREENING FOR DIABETES MELLITUS: ICD-10-CM

## 2018-04-06 LAB
CHOLEST SERPL-MCNC: 180 MG/DL
GLUCOSE SERPL-MCNC: 87 MG/DL (ref 70–99)
HDLC SERPL-MCNC: 60 MG/DL
LDLC SERPL CALC-MCNC: 80 MG/DL
NONHDLC SERPL-MCNC: 120 MG/DL
TRIGL SERPL-MCNC: 201 MG/DL

## 2018-04-06 PROCEDURE — 36415 COLL VENOUS BLD VENIPUNCTURE: CPT | Performed by: PHYSICIAN ASSISTANT

## 2018-04-06 PROCEDURE — 77067 SCR MAMMO BI INCL CAD: CPT | Mod: TC

## 2018-04-06 PROCEDURE — 82947 ASSAY GLUCOSE BLOOD QUANT: CPT | Performed by: PHYSICIAN ASSISTANT

## 2018-04-06 PROCEDURE — 80061 LIPID PANEL: CPT | Performed by: PHYSICIAN ASSISTANT

## 2018-04-16 ENCOUNTER — RADIANT APPOINTMENT (OUTPATIENT)
Dept: ULTRASOUND IMAGING | Facility: CLINIC | Age: 48
End: 2018-04-16
Attending: PHYSICIAN ASSISTANT
Payer: COMMERCIAL

## 2018-04-16 ENCOUNTER — RADIANT APPOINTMENT (OUTPATIENT)
Dept: MAMMOGRAPHY | Facility: CLINIC | Age: 48
End: 2018-04-16
Attending: PHYSICIAN ASSISTANT
Payer: COMMERCIAL

## 2018-04-16 DIAGNOSIS — R92.8 ABNORMAL MAMMOGRAM: ICD-10-CM

## 2018-04-16 PROCEDURE — 76642 ULTRASOUND BREAST LIMITED: CPT | Mod: RT | Performed by: RADIOLOGY

## 2018-04-16 PROCEDURE — 77065 DX MAMMO INCL CAD UNI: CPT | Mod: RT | Performed by: RADIOLOGY

## 2018-06-14 DIAGNOSIS — J32.4 CHRONIC PANSINUSITIS: Primary | ICD-10-CM

## 2018-06-14 NOTE — TELEPHONE ENCOUNTER
"Patient returned call, she keeps one prescription \"on hand\" for future sinus infections.     Routing to  per Dr. Aurelia Agrawal recommendation      Thank you, Nyla Pastor, BSN RN   "

## 2018-06-14 NOTE — TELEPHONE ENCOUNTER
Why is pt requesting azithromycin?  Last prescribed by Dr. Chester of ENT.  Recommend this be routed to Dr. Chester/ENT.

## 2018-06-14 NOTE — TELEPHONE ENCOUNTER
Left message for patient to call back on their VM. Writers direct line given, Chelsy HUTCHISON [phone]. Nyla Pastor RN

## 2018-06-15 RX ORDER — AZITHROMYCIN 250 MG/1
250 TABLET, FILM COATED ORAL DAILY
Qty: 6 TABLET | Refills: 6 | Status: SHIPPED | OUTPATIENT
Start: 2018-06-15 | End: 2019-08-01

## 2019-03-02 PROCEDURE — 99284 EMERGENCY DEPT VISIT MOD MDM: CPT | Mod: 25 | Performed by: FAMILY MEDICINE

## 2019-03-02 PROCEDURE — 90471 IMMUNIZATION ADMIN: CPT | Performed by: FAMILY MEDICINE

## 2019-03-02 PROCEDURE — 12032 INTMD RPR S/A/T/EXT 2.6-7.5: CPT | Performed by: FAMILY MEDICINE

## 2019-03-02 PROCEDURE — 99283 EMERGENCY DEPT VISIT LOW MDM: CPT | Mod: Z6 | Performed by: FAMILY MEDICINE

## 2019-03-02 PROCEDURE — 12032 INTMD RPR S/A/T/EXT 2.6-7.5: CPT | Mod: Z6 | Performed by: FAMILY MEDICINE

## 2019-03-02 ASSESSMENT — MIFFLIN-ST. JEOR: SCORE: 1191.53

## 2019-03-03 ENCOUNTER — APPOINTMENT (OUTPATIENT)
Dept: GENERAL RADIOLOGY | Facility: OTHER | Age: 49
End: 2019-03-03
Attending: FAMILY MEDICINE
Payer: COMMERCIAL

## 2019-03-03 ENCOUNTER — HOSPITAL ENCOUNTER (EMERGENCY)
Facility: OTHER | Age: 49
Discharge: HOME | End: 2019-03-03
Admitting: FAMILY MEDICINE
Payer: COMMERCIAL

## 2019-03-03 VITALS
HEART RATE: 68 BPM | WEIGHT: 126 LBS | BODY MASS INDEX: 21.51 KG/M2 | OXYGEN SATURATION: 99 % | DIASTOLIC BLOOD PRESSURE: 67 MMHG | TEMPERATURE: 98.7 F | RESPIRATION RATE: 16 BRPM | SYSTOLIC BLOOD PRESSURE: 134 MMHG | HEIGHT: 64 IN

## 2019-03-03 DIAGNOSIS — S81.812A LACERATION OF LEG, LEFT, INITIAL ENCOUNTER: ICD-10-CM

## 2019-03-03 PROCEDURE — 90715 TDAP VACCINE 7 YRS/> IM: CPT | Performed by: FAMILY MEDICINE

## 2019-03-03 PROCEDURE — 25000128 H RX IP 250 OP 636: Performed by: FAMILY MEDICINE

## 2019-03-03 PROCEDURE — 73590 X-RAY EXAM OF LOWER LEG: CPT | Mod: TC,LT

## 2019-03-03 PROCEDURE — 25000125 ZZHC RX 250: Performed by: FAMILY MEDICINE

## 2019-03-03 PROCEDURE — 90471 IMMUNIZATION ADMIN: CPT | Performed by: FAMILY MEDICINE

## 2019-03-03 RX ORDER — LIDOCAINE HYDROCHLORIDE AND EPINEPHRINE 10; 10 MG/ML; UG/ML
1 INJECTION, SOLUTION INFILTRATION; PERINEURAL ONCE
Status: COMPLETED | OUTPATIENT
Start: 2019-03-03 | End: 2019-03-03

## 2019-03-03 RX ADMIN — TETANUS TOXOID, REDUCED DIPHTHERIA TOXOID AND ACELLULAR PERTUSSIS VACCINE, ADSORBED 0.5 ML: 5; 2.5; 8; 8; 2.5 SUSPENSION INTRAMUSCULAR at 02:00

## 2019-03-03 RX ADMIN — LIDOCAINE HYDROCHLORIDE,EPINEPHRINE BITARTRATE 10 ML: 10; .01 INJECTION, SOLUTION INFILTRATION; PERINEURAL at 02:02

## 2019-03-03 NOTE — ED AVS SNAPSHOT
[facility]  [address]  [place] [address]  Phone:  [phone]  Fax:  [phone]                                    Annabelle Greene   MRN: [id number]    Department:  [facility]   Date of Visit:  3/2/2019           After Visit Summary Signature Page    I have received my discharge instructions, and my questions have been answered. I have discussed any challenges I see with this plan with the nurse or doctor.    ..........................................................................................................................................  Patient/Patient Representative Signature      ..........................................................................................................................................  Patient Representative Print Name and Relationship to Patient    ..................................................               ................................................  Date                                   Time    ..........................................................................................................................................  Reviewed by Signature/Title    ...................................................              ..............................................  Date                                               Time          22EPIC Rev 08/18

## 2019-03-03 NOTE — ED NOTES
Wound 4.75 cm long. Sutures in place. Wound dressed with bacitracin, petroleum gauze and ace wrap. Patient tolerated. Adeola Land RN on 3/3/2019 at 2:06 AM
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
## 2019-03-03 NOTE — ED NOTES
Bleeding controlled, patient removed old bandage, new bandage applied. Patient did cleanse wound with hydrogen peroxide. Adeola Land RN on 3/3/2019 at 12:40 AM

## 2019-03-04 ASSESSMENT — ENCOUNTER SYMPTOMS
GASTROINTESTINAL NEGATIVE: 1
CARDIOVASCULAR NEGATIVE: 1
MUSCULOSKELETAL NEGATIVE: 1
RESPIRATORY NEGATIVE: 1
PSYCHIATRIC NEGATIVE: 1
WOUND: 1
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
## 2019-03-11 ENCOUNTER — ALLIED HEALTH/NURSE VISIT (OUTPATIENT)
Dept: NURSING | Facility: CLINIC | Age: 49
End: 2019-03-11
Payer: COMMERCIAL

## 2019-03-11 DIAGNOSIS — Z48.02 ENCOUNTER FOR REMOVAL OF SUTURES: Primary | ICD-10-CM

## 2019-03-11 PROCEDURE — 99207 ZZC NO CHARGE NURSE ONLY: CPT

## 2019-03-11 NOTE — NURSING NOTE
Annabelle Greene presents to the clinic today for removal of sutures.  The patient has had the sutures in place for 8 days.  There has been no history of infection or drainage.  13 sutures are seen located on the left shin.  The wound is healing well with no signs of infection.  Tetanus status is up to date.   All sutures were easily removed today.  Routine wound care discussed.  The patient will follow up as needed.  Steri strips were applied as there was minimal gaping after removing sutures.   Patient is leaving tomorrow for [place]. Advised patient to keep would clean and dry to allow continual healing and monitor for infection      Orders placed by [facility]:    Follow-Ups: Follow up with Kehr, Kristen M, PA-C (Physician Assistant - Medical); 7-10 days for suture removal       Genevieve ELIZALDEN, RN, CPN

## 2019-03-30 ENCOUNTER — MYC REFILL (OUTPATIENT)
Dept: FAMILY MEDICINE | Facility: CLINIC | Age: 49
End: 2019-03-30

## 2019-03-30 DIAGNOSIS — F41.1 GENERALIZED ANXIETY DISORDER: ICD-10-CM

## 2019-03-30 DIAGNOSIS — B00.1 RECURRENT COLD SORES: ICD-10-CM

## 2019-03-30 NOTE — LETTER
April 2, 2019    Annabelle Greene  [address] [place] [id number]    Dear Annabelle,       We recently received a refill request for Medications.  We have refilled this for a one time 30 day supply only because you are due for a:    Physical office visit      Please call at your earliest convenience so that there will not be a delay with your future refills.          Thank you,   Your [facility] Team/mkr  [phone]  3

## 2019-04-01 RX ORDER — VALACYCLOVIR HYDROCHLORIDE 500 MG/1
500 TABLET, FILM COATED ORAL 2 TIMES DAILY
Qty: 30 TABLET | Refills: 0 | Status: SHIPPED | OUTPATIENT
Start: 2019-04-01 | End: 2019-07-31

## 2019-04-01 RX ORDER — PROPRANOLOL HYDROCHLORIDE 20 MG/1
TABLET ORAL
Qty: 30 TABLET | Refills: 0 | Status: SHIPPED | OUTPATIENT
Start: 2019-04-01 | End: 2019-07-31

## 2019-04-01 NOTE — TELEPHONE ENCOUNTER
Last office visit 2/12/18  Prescription approved per [facility] Refill Protocol #30  APPT NEEDED FOR FURTHER REFILLS  Please help the pt schedule an appointment physical   Health Maintenance Due   Topic Date Due     HIV SCREEN (SYSTEM ASSIGNED)  08/22/1989     PREVENTIVE CARE VISIT  02/27/2019     PHQ-2 Q1 YR  02/27/2019     Stefani Noriega RN

## 2019-05-13 ENCOUNTER — APPOINTMENT (OUTPATIENT)
Age: 49
Setting detail: DERMATOLOGY
End: 2019-06-06

## 2019-05-13 VITALS — WEIGHT: 125 LBS | RESPIRATION RATE: 16 BRPM | HEIGHT: 64 IN

## 2019-06-25 ENCOUNTER — APPOINTMENT (OUTPATIENT)
Age: 49
Setting detail: DERMATOLOGY
End: 2019-06-28

## 2019-06-25 VITALS — HEIGHT: 64 IN | WEIGHT: 124 LBS | RESPIRATION RATE: 15 BRPM

## 2019-06-25 DIAGNOSIS — Z41.9 ENCOUNTER FOR PROCEDURE FOR PURPOSES OTHER THAN REMEDYING HEALTH STATE, UNSPECIFIED: ICD-10-CM

## 2019-06-25 PROCEDURE — OTHER BOTOX (U OR CC): OTHER

## 2019-06-25 NOTE — PROCEDURE: BOTOX (U OR CC)
Consent: - Verbal and written consent obtained. \\n- Discussed the risks that include but not limited to lid/brow ptosis, bruising, headache, asymmetry, swelling, diplopia, temporary effect, and incomplete chemical denervation.\\n- Advised that it can take up to 14 days for the full effect of the Botox to take place.

## 2019-06-25 NOTE — PROCEDURE: BOTOX (U OR CC)
Post-Care Instructions: - Skin was cleansed with 70% isopropyl alcohol prior to injection today.\\n- Patient was instructed to not lie down for touch area for 6 hrs.\\n\\nTOTAL BOTOX USED TODAY: 28 units\\n\\nCOLOR KEY FOR DIAGRAM BELOW:\\nRed = 1/2 unit of Botox per injection\\nOrange = 1 units of Botox per injection\\nYellow = 2 units of Botox per injection\\nGreen = 3 units of Botox per injection\\nBlue = 4 units of Botox per injection\\nBrown = 5 units of Botox per injection\\nBlack = 6 units of Botox per injection

## 2019-07-31 DIAGNOSIS — B00.1 RECURRENT COLD SORES: ICD-10-CM

## 2019-07-31 DIAGNOSIS — J32.4 CHRONIC PANSINUSITIS: ICD-10-CM

## 2019-07-31 DIAGNOSIS — F41.1 GENERALIZED ANXIETY DISORDER: ICD-10-CM

## 2019-07-31 NOTE — TELEPHONE ENCOUNTER
Azithromycin 250mg tab      Last Written Prescription Date:  5/11/19  Last Fill Quantity: 6,   # refills: 0  Last Office Visit: 2/27/18  Future Office visit:       Routing refill request to provider for review/approval because:  Drug not on the [facility], [facility] or [facility] refill protocol or controlled substance

## 2019-08-01 RX ORDER — VALACYCLOVIR HYDROCHLORIDE 500 MG/1
TABLET, FILM COATED ORAL
Qty: 30 TABLET | Refills: 3 | Status: SHIPPED | OUTPATIENT
Start: 2019-08-01 | End: 2021-04-02

## 2019-08-01 RX ORDER — PROPRANOLOL HYDROCHLORIDE 20 MG/1
TABLET ORAL
Qty: 30 TABLET | Refills: 3 | Status: SHIPPED | OUTPATIENT
Start: 2019-08-01 | End: 2020-09-09

## 2019-08-01 RX ORDER — AZITHROMYCIN 250 MG/1
250 TABLET, FILM COATED ORAL DAILY
Qty: 6 TABLET | Refills: 6 | Status: SHIPPED | OUTPATIENT
Start: 2019-08-01 | End: 2020-10-20

## 2019-08-01 NOTE — TELEPHONE ENCOUNTER
See 3-30-19.  Routing refill request to provider for review/approval because:  Dayan given x1 and patient did not follow up, please advise  Karin ELIZALDEN, RN

## 2019-10-22 ENCOUNTER — APPOINTMENT (OUTPATIENT)
Age: 49
Setting detail: DERMATOLOGY
End: 2019-10-25

## 2019-10-22 VITALS — WEIGHT: 125 LBS | RESPIRATION RATE: 18 BRPM | HEIGHT: 64 IN

## 2019-10-22 DIAGNOSIS — Z41.9 ENCOUNTER FOR PROCEDURE FOR PURPOSES OTHER THAN REMEDYING HEALTH STATE, UNSPECIFIED: ICD-10-CM

## 2019-10-22 DIAGNOSIS — L72.8 OTHER FOLLICULAR CYSTS OF THE SKIN AND SUBCUTANEOUS TISSUE: ICD-10-CM

## 2019-10-22 PROCEDURE — OTHER INTRALESIONAL KENALOG: OTHER

## 2019-10-22 PROCEDURE — 11900 INJECT SKIN LESIONS </W 7: CPT

## 2019-10-22 PROCEDURE — OTHER BOTOX (U OR CC): OTHER

## 2019-10-22 ASSESSMENT — LOCATION DETAILED DESCRIPTION DERM
LOCATION DETAILED: LEFT INFERIOR MEDIAL MALAR CHEEK
LOCATION DETAILED: LEFT CENTRAL BUCCAL CHEEK

## 2019-10-22 ASSESSMENT — LOCATION SIMPLE DESCRIPTION DERM: LOCATION SIMPLE: LEFT CHEEK

## 2019-10-22 ASSESSMENT — LOCATION ZONE DERM: LOCATION ZONE: FACE

## 2019-10-22 NOTE — PROCEDURE: BOTOX (U OR CC)
Post-Care Instructions: \\n- Skin was cleansed with 70% isopropyl alcohol prior to injection today.\\n- Patient was instructed to not lie down for touch area for 6 hrs.\\n\\nTOTAL BOTOX USED TODAY: 31 units\\n\\nCOLOR KEY FOR DIAGRAM BELOW:\\nRed = 1/2 unit of Botox per injection\\nOrange = 1 units of Botox per injection\\nYellow = 2 units of Botox per injection\\nGreen = 3 units of Botox per injection\\nBlue = 4 units of Botox per injection\\nBrown = 5 units of Botox per injection\\nBlack = 6 units of Botox per injection

## 2019-10-22 NOTE — PROCEDURE: BOTOX (U OR CC)
Price (Use Numbers Only, No Special Characters Or $): 325 Price (Use Numbers Only, No Special Characters Or $): 726

## 2020-02-12 ENCOUNTER — APPOINTMENT (OUTPATIENT)
Age: 50
Setting detail: DERMATOLOGY
End: 2020-02-16

## 2020-02-12 VITALS — HEIGHT: 55 IN | WEIGHT: 130 LBS | RESPIRATION RATE: 15 BRPM

## 2020-02-12 DIAGNOSIS — Z41.9 ENCOUNTER FOR PROCEDURE FOR PURPOSES OTHER THAN REMEDYING HEALTH STATE, UNSPECIFIED: ICD-10-CM

## 2020-02-12 PROCEDURE — OTHER BOTOX (U OR CC): OTHER

## 2020-02-12 NOTE — PROCEDURE: BOTOX (U OR CC)
Consent: - Verbal and written consent obtained. \\n- Discussed the risks that include but not limited to lid/brow ptosis, bruising, headache, asymmetry, swelling, diplopia, temporary effect, and incomplete chemical denervation.\\n- Advised that it can take up to 14 days for the full effect of the Botox to take place.\\n- Generally the effects of Botox are expected to last for 3-4 months, but length of duration can vary.

## 2020-02-12 NOTE — PROCEDURE: BOTOX (U OR CC)
Post-Care Instructions: - Skin was cleansed with 70% isopropyl alcohol prior to injection today.\\n- Patient was instructed to not lie down for touch area for 6 hrs.\\n\\nTOTAL BOTOX USED TODAY: 31 units\\n\\nCOLOR KEY FOR DIAGRAM BELOW:\\nRed = 1/2 unit of Botox per injection\\nOrange = 1 units of Botox per injection\\nYellow = 2 units of Botox per injection\\nGreen = 3 units of Botox per injection\\nBlue = 4 units of Botox per injection\\nBrown = 5 units of Botox per injection\\nBlack = 6 units of Botox per injection

## 2020-02-12 NOTE — PROCEDURE: BOTOX (U OR CC)
Price (Use Numbers Only, No Special Characters Or $): 264 Price (Use Numbers Only, No Special Characters Or $): 210

## 2020-02-24 ENCOUNTER — HEALTH MAINTENANCE LETTER (OUTPATIENT)
Age: 50
End: 2020-02-24

## 2020-05-24 ENCOUNTER — MYC MEDICAL ADVICE (OUTPATIENT)
Dept: FAMILY MEDICINE | Facility: CLINIC | Age: 50
End: 2020-05-24

## 2020-06-04 ENCOUNTER — APPOINTMENT (OUTPATIENT)
Age: 50
Setting detail: DERMATOLOGY
End: 2020-06-07

## 2020-06-04 VITALS — WEIGHT: 125 LBS | HEIGHT: 64 IN | RESPIRATION RATE: 15 BRPM

## 2020-06-04 DIAGNOSIS — L70.0 ACNE VULGARIS: ICD-10-CM

## 2020-06-04 PROCEDURE — OTHER PRESCRIPTION: OTHER

## 2020-06-04 PROCEDURE — 99213 OFFICE O/P EST LOW 20 MIN: CPT

## 2020-06-04 PROCEDURE — OTHER COUNSELING: OTHER

## 2020-06-04 RX ORDER — CLINDAMYCIN PHOSPHATE 10 MG/G
1% GEL TOPICAL BID
Qty: 1 | Refills: 0 | Status: ERX | COMMUNITY
Start: 2020-06-04

## 2020-06-04 RX ORDER — TRETIONIN 0.25 MG/G
0.03 CREAM TOPICAL QHS
Qty: 1 | Refills: 0 | Status: ERX | COMMUNITY
Start: 2020-06-04

## 2020-06-04 NOTE — PROCEDURE: COUNSELING
Tetracycline Pregnancy And Lactation Text: This medication is Pregnancy Category D and not consider safe during pregnancy. It is also excreted in breast milk.
Isotretinoin Counseling: Patient should get monthly blood tests, not donate blood, not drive at night if vision affected, not share medication, and not undergo elective surgery for 6 months after tx completed. Side effects reviewed, pt to contact office should one occur.
Topical Sulfur Applications Pregnancy And Lactation Text: This medication is Pregnancy Category C and has an unknown safety profile during pregnancy. It is unknown if this topical medication is excreted in breast milk.
Use Enhanced Medication Counseling?: No
Birth Control Pills Pregnancy And Lactation Text: This medication should be avoided if pregnant and for the first 30 days post-partum.
Doxycycline Counseling:  Patient counseled regarding possible photosensitivity and increased risk for sunburn.  Patient instructed to avoid sunlight, if possible.  When exposed to sunlight, patients should wear protective clothing, sunglasses, and sunscreen.  The patient was instructed to call the office immediately if the following severe adverse effects occur:  hearing changes, easy bruising/bleeding, severe headache, or vision changes.  The patient verbalized understanding of the proper use and possible adverse effects of doxycycline.  All of the patient's questions and concerns were addressed.
Tetracycline Counseling: Patient counseled regarding possible photosensitivity and increased risk for sunburn.  Patient instructed to avoid sunlight, if possible.  When exposed to sunlight, patients should wear protective clothing, sunglasses, and sunscreen.  The patient was instructed to call the office immediately if the following severe adverse effects occur:  hearing changes, easy bruising/bleeding, severe headache, or vision changes.  The patient verbalized understanding of the proper use and possible adverse effects of tetracycline.  All of the patient's questions and concerns were addressed. Patient understands to avoid pregnancy while on therapy due to potential birth defects.
Azithromycin Pregnancy And Lactation Text: This medication is considered safe during pregnancy and is also secreted in breast milk.
Bactrim Counseling:  I discussed with the patient the risks of sulfa antibiotics including but not limited to GI upset, allergic reaction, drug rash, diarrhea, dizziness, photosensitivity, and yeast infections.  Rarely, more serious reactions can occur including but not limited to aplastic anemia, agranulocytosis, methemoglobinemia, blood dyscrasias, liver or kidney failure, lung infiltrates or desquamative/blistering drug rashes.
Erythromycin Pregnancy And Lactation Text: This medication is Pregnancy Category B and is considered safe during pregnancy. It is also excreted in breast milk.
Detail Level: Detailed
Isotretinoin Pregnancy And Lactation Text: This medication is Pregnancy Category X and is considered extremely dangerous during pregnancy. It is unknown if it is excreted in breast milk.
Tazorac Counseling:  Patient advised that medication is irritating and drying.  Patient may need to apply sparingly and wash off after an hour before eventually leaving it on overnight.  The patient verbalized understanding of the proper use and possible adverse effects of tazorac.  All of the patient's questions and concerns were addressed.
Topical Clindamycin Counseling: Patient counseled that this medication may cause skin irritation or allergic reactions.  In the event of skin irritation, the patient was advised to reduce the amount of the drug applied or use it less frequently.   The patient verbalized understanding of the proper use and possible adverse effects of clindamycin.  All of the patient's questions and concerns were addressed.
Dapsone Pregnancy And Lactation Text: This medication is Pregnancy Category C and is not considered safe during pregnancy or breast feeding.
Benzoyl Peroxide Pregnancy And Lactation Text: This medication is Pregnancy Category C. It is unknown if benzoyl peroxide is excreted in breast milk.
High Dose Vitamin A Counseling: Side effects reviewed, pt to contact office should one occur.
Spironolactone Counseling: Patient advised regarding risks of diarrhea, abdominal pain, hyperkalemia, birth defects (for female patients), liver toxicity and renal toxicity. The patient may need blood work to monitor liver and kidney function and potassium levels while on therapy. The patient verbalized understanding of the proper use and possible adverse effects of spironolactone.  All of the patient's questions and concerns were addressed.
Tazorac Pregnancy And Lactation Text: This medication is not safe during pregnancy. It is unknown if this medication is excreted in breast milk.
Topical Clindamycin Pregnancy And Lactation Text: This medication is Pregnancy Category B and is considered safe during pregnancy. It is unknown if it is excreted in breast milk.
Minocycline Counseling: Patient advised regarding possible photosensitivity and discoloration of the teeth, skin, lips, tongue and gums.  Patient instructed to avoid sunlight, if possible.  When exposed to sunlight, patients should wear protective clothing, sunglasses, and sunscreen.  The patient was instructed to call the office immediately if the following severe adverse effects occur:  hearing changes, easy bruising/bleeding, severe headache, or vision changes.  The patient verbalized understanding of the proper use and possible adverse effects of minocycline.  All of the patient's questions and concerns were addressed.
Sarecycline Counseling: Patient advised regarding possible photosensitivity and discoloration of the teeth, skin, lips, tongue and gums.  Patient instructed to avoid sunlight, if possible.  When exposed to sunlight, patients should wear protective clothing, sunglasses, and sunscreen.  The patient was instructed to call the office immediately if the following severe adverse effects occur:  hearing changes, easy bruising/bleeding, severe headache, or vision changes.  The patient verbalized understanding of the proper use and possible adverse effects of sarecycline.  All of the patient's questions and concerns were addressed.
Birth Control Pills Counseling: Birth Control Pill Counseling: I discussed with the patient the potential side effects of OCPs including but not limited to increased risk of stroke, heart attack, thrombophlebitis, deep venous thrombosis, hepatic adenomas, breast changes, GI upset, headaches, and depression.  The patient verbalized understanding of the proper use and possible adverse effects of OCPs. All of the patient's questions and concerns were addressed.
Bactrim Pregnancy And Lactation Text: This medication is Pregnancy Category D and is known to cause fetal risk.  It is also excreted in breast milk.
Dapsone Counseling: I discussed with the patient the risks of dapsone including but not limited to hemolytic anemia, agranulocytosis, rashes, methemoglobinemia, kidney failure, peripheral neuropathy, headaches, GI upset, and liver toxicity.  Patients who start dapsone require monitoring including baseline LFTs and weekly CBCs for the first month, then every month thereafter.  The patient verbalized understanding of the proper use and possible adverse effects of dapsone.  All of the patient's questions and concerns were addressed.
Erythromycin Counseling:  I discussed with the patient the risks of erythromycin including but not limited to GI upset, allergic reaction, drug rash, diarrhea, increase in liver enzymes, and yeast infections.
Benzoyl Peroxide Counseling: Patient counseled that medicine may cause skin irritation and bleach clothing.  In the event of skin irritation, the patient was advised to reduce the amount of the drug applied or use it less frequently.   The patient verbalized understanding of the proper use and possible adverse effects of benzoyl peroxide.  All of the patient's questions and concerns were addressed.
Azithromycin Counseling:  I discussed with the patient the risks of azithromycin including but not limited to GI upset, allergic reaction, drug rash, diarrhea, and yeast infections.
Topical Sulfur Applications Counseling: Topical Sulfur Counseling: Patient counseled that this medication may cause skin irritation or allergic reactions.  In the event of skin irritation, the patient was advised to reduce the amount of the drug applied or use it less frequently.   The patient verbalized understanding of the proper use and possible adverse effects of topical sulfur application.  All of the patient's questions and concerns were addressed.
Spironolactone Pregnancy And Lactation Text: This medication can cause feminization of the male fetus and should be avoided during pregnancy. The active metabolite is also found in breast milk.
High Dose Vitamin A Pregnancy And Lactation Text: High dose vitamin A therapy is contraindicated during pregnancy and breast feeding.
Topical Retinoid Pregnancy And Lactation Text: This medication is Pregnancy Category C. It is unknown if this medication is excreted in breast milk.
Topical Retinoid counseling:  Patient advised to apply a pea-sized amount only at bedtime and wait 30 minutes after washing their face before applying.  If too drying, patient may add a non-comedogenic moisturizer. The patient verbalized understanding of the proper use and possible adverse effects of retinoids.  All of the patient's questions and concerns were addressed.
Doxycycline Pregnancy And Lactation Text: This medication is Pregnancy Category D and not consider safe during pregnancy. It is also excreted in breast milk but is considered safe for shorter treatment courses.

## 2020-07-15 ENCOUNTER — E-VISIT (OUTPATIENT)
Dept: FAMILY MEDICINE | Facility: CLINIC | Age: 50
End: 2020-07-15
Payer: COMMERCIAL

## 2020-07-15 DIAGNOSIS — F32.A ANXIETY AND DEPRESSION: Primary | ICD-10-CM

## 2020-07-15 DIAGNOSIS — F41.9 ANXIETY AND DEPRESSION: Primary | ICD-10-CM

## 2020-07-15 PROCEDURE — 99421 OL DIG E/M SVC 5-10 MIN: CPT | Performed by: PHYSICIAN ASSISTANT

## 2020-07-15 ASSESSMENT — PATIENT HEALTH QUESTIONNAIRE - PHQ9
SUM OF ALL RESPONSES TO PHQ QUESTIONS 1-9: 13
SUM OF ALL RESPONSES TO PHQ QUESTIONS 1-9: 13
10. IF YOU CHECKED OFF ANY PROBLEMS, HOW DIFFICULT HAVE THESE PROBLEMS MADE IT FOR YOU TO DO YOUR WORK, TAKE CARE OF THINGS AT HOME, OR GET ALONG WITH OTHER PEOPLE: SOMEWHAT DIFFICULT

## 2020-07-15 ASSESSMENT — ANXIETY QUESTIONNAIRES
4. TROUBLE RELAXING: NEARLY EVERY DAY
2. NOT BEING ABLE TO STOP OR CONTROL WORRYING: NEARLY EVERY DAY
3. WORRYING TOO MUCH ABOUT DIFFERENT THINGS: NEARLY EVERY DAY
7. FEELING AFRAID AS IF SOMETHING AWFUL MIGHT HAPPEN: SEVERAL DAYS
GAD7 TOTAL SCORE: 16
GAD7 TOTAL SCORE: 16
7. FEELING AFRAID AS IF SOMETHING AWFUL MIGHT HAPPEN: SEVERAL DAYS
GAD7 TOTAL SCORE: 16
1. FEELING NERVOUS, ANXIOUS, OR ON EDGE: MORE THAN HALF THE DAYS
5. BEING SO RESTLESS THAT IT IS HARD TO SIT STILL: MORE THAN HALF THE DAYS
6. BECOMING EASILY ANNOYED OR IRRITABLE: MORE THAN HALF THE DAYS

## 2020-07-16 RX ORDER — ESCITALOPRAM OXALATE 10 MG/1
TABLET ORAL
Qty: 30 TABLET | Refills: 1 | Status: SHIPPED | OUTPATIENT
Start: 2020-07-16 | End: 2020-09-09

## 2020-07-16 ASSESSMENT — PATIENT HEALTH QUESTIONNAIRE - PHQ9: SUM OF ALL RESPONSES TO PHQ QUESTIONS 1-9: 13

## 2020-07-16 ASSESSMENT — ANXIETY QUESTIONNAIRES: GAD7 TOTAL SCORE: 16

## 2020-08-10 ENCOUNTER — RX ONLY (RX ONLY)
Age: 50
End: 2020-08-10

## 2020-08-10 RX ORDER — SPIRONOLACTONE 50 MG/1
50MG TABLET, FILM COATED ORAL BID
Qty: 60 | Refills: 0 | Status: ERX

## 2020-09-07 DIAGNOSIS — F32.A ANXIETY AND DEPRESSION: ICD-10-CM

## 2020-09-07 DIAGNOSIS — F41.9 ANXIETY AND DEPRESSION: ICD-10-CM

## 2020-09-07 DIAGNOSIS — F41.1 GENERALIZED ANXIETY DISORDER: ICD-10-CM

## 2020-09-08 NOTE — TELEPHONE ENCOUNTER
Per Evisit 7/15/20, patient advise follow up in 4-6 weeks.   No pending appointment.  Please advise  Stefani Noriega RN

## 2020-09-09 RX ORDER — PROPRANOLOL HYDROCHLORIDE 20 MG/1
TABLET ORAL
Qty: 90 TABLET | Refills: 1 | Status: SHIPPED | OUTPATIENT
Start: 2020-09-09 | End: 2021-04-02

## 2020-09-09 RX ORDER — ESCITALOPRAM OXALATE 10 MG/1
TABLET ORAL
Qty: 90 TABLET | Refills: 1 | Status: SHIPPED | OUTPATIENT
Start: 2020-09-09 | End: 2021-03-03

## 2020-09-15 ENCOUNTER — APPOINTMENT (OUTPATIENT)
Dept: URBAN - METROPOLITAN AREA CLINIC 252 | Age: 50
Setting detail: DERMATOLOGY
End: 2020-09-15

## 2020-09-15 DIAGNOSIS — Z41.9 ENCOUNTER FOR PROCEDURE FOR PURPOSES OTHER THAN REMEDYING HEALTH STATE, UNSPECIFIED: ICD-10-CM

## 2020-09-15 PROCEDURE — OTHER BOTOX (U OR CC): OTHER

## 2020-10-15 DIAGNOSIS — J32.4 CHRONIC PANSINUSITIS: ICD-10-CM

## 2020-10-20 ENCOUNTER — TELEPHONE (OUTPATIENT)
Dept: FAMILY MEDICINE | Facility: CLINIC | Age: 50
End: 2020-10-20

## 2020-10-20 ENCOUNTER — MYC REFILL (OUTPATIENT)
Dept: OTOLARYNGOLOGY | Facility: CLINIC | Age: 50
End: 2020-10-20

## 2020-10-20 DIAGNOSIS — J32.4 CHRONIC PANSINUSITIS: ICD-10-CM

## 2020-10-20 NOTE — TELEPHONE ENCOUNTER
Called pt and left a VM to return call or send [place] message in regards to the Zithromax refill. Has she been using this prescription? If so how often? She has not seen Dr Chester in over a year. Her most recent visit was with Bon in 2017...    Keily Gupta RN Specialty Triage 10/20/2020 11:05 AM

## 2020-10-20 NOTE — TELEPHONE ENCOUNTER
Reason for Call:  Other pt returned call from today    Detailed comments: returned call    Phone Number Patient can be reached at: Home number on file [phone] (home)    Best Time: any    Can we leave a detailed message on this number? YES    Call taken on 10/20/2020 at 12:25 PM by Miley Carmichael

## 2020-10-20 NOTE — TELEPHONE ENCOUNTER
Returned call, bg went straight to . Left call back number.    Keily Gupta, RN Specialty Triage 10/20/2020 2:25 PM

## 2020-10-21 ENCOUNTER — TELEPHONE (OUTPATIENT)
Dept: OTOLARYNGOLOGY | Facility: CLINIC | Age: 50
End: 2020-10-21

## 2020-10-21 RX ORDER — AZITHROMYCIN 250 MG/1
250 TABLET, FILM COATED ORAL DAILY
Qty: 6 TABLET | Refills: 6 | Status: SHIPPED | OUTPATIENT
Start: 2020-10-21 | End: 2022-04-06

## 2020-10-21 NOTE — TELEPHONE ENCOUNTER
Reason for call:  Other   Patient called regarding (reason for call): call back  Additional comments: Francesco from [facility] is calling wanting clarification on the medication azithromycin (ZITHROMAX) 250 MG tablet, please call back    Phone number to reach patient:  Other phone number:  [phone]    Best Time:  any    Can we leave a detailed message on this number?  YES    Travel screening: Not Applicable

## 2020-10-21 NOTE — TELEPHONE ENCOUNTER
Returned call to esau. Clarified not a standard z dmitry. Sig is correct, PRN for chronic pansinusitis.    Keily Gupta RN Specialty Triage 10/21/2020 2:52 PM

## 2020-10-23 RX ORDER — AZITHROMYCIN 250 MG/1
250 TABLET, FILM COATED ORAL DAILY
Qty: 6 TABLET | Refills: 6 | Status: CANCELLED | OUTPATIENT
Start: 2020-10-23

## 2020-12-13 ENCOUNTER — HEALTH MAINTENANCE LETTER (OUTPATIENT)
Age: 50
End: 2020-12-13

## 2020-12-16 ENCOUNTER — APPOINTMENT (OUTPATIENT)
Dept: URBAN - METROPOLITAN AREA CLINIC 252 | Age: 50
Setting detail: DERMATOLOGY
End: 2020-12-16

## 2020-12-16 DIAGNOSIS — Z41.9 ENCOUNTER FOR PROCEDURE FOR PURPOSES OTHER THAN REMEDYING HEALTH STATE, UNSPECIFIED: ICD-10-CM

## 2020-12-16 PROCEDURE — OTHER BOTOX (U OR CC): OTHER

## 2020-12-16 NOTE — PROCEDURE: BOTOX (U OR CC)
Price (Use Numbers Only, No Special Characters Or $): 357 Price (Use Numbers Only, No Special Characters Or $): 094

## 2020-12-16 NOTE — PROCEDURE: BOTOX (U OR CC)
Post-Care Instructions: - Skin was cleansed with 70% isopropyl alcohol prior to injection today.\\n- Patient was instructed to not lie down for touch area for 6 hrs.\\n\\nTOTAL BOTOX USED TODAY: 36 units\\n\\nCOLOR KEY FOR DIAGRAM BELOW:\\nRed = 1/2 unit of Botox per injection\\nOrange = 1 units of Botox per injection\\nYellow = 2 units of Botox per injection\\nGreen = 3 units of Botox per injection\\nBlue = 4 units of Botox per injection\\nBrown = 5 units of Botox per injection\\nBlack = 6 units of Botox per injection

## 2021-03-01 DIAGNOSIS — F41.9 ANXIETY AND DEPRESSION: ICD-10-CM

## 2021-03-01 DIAGNOSIS — F32.A ANXIETY AND DEPRESSION: ICD-10-CM

## 2021-03-01 NOTE — LETTER
March 3, 2021    Annabelle Greene  [address] [place] [id number]    Dear Annabelle,       We recently received a refill request for escitalopram (LEXAPRO) 10 MG tablet.  We have refilled this for a one time 30 day supply only because you are due for a:    Video/medication check visit      Please call at your earliest convenience so that there will not be a delay with your future refills.          Thank you,   Your [facility] Team/  [phone]

## 2021-03-02 NOTE — TELEPHONE ENCOUNTER
Routing refill request to provider for review/approval because:  Patient needs to be seen because it has been more than 1 year since last office visit.    No appointment pending at this time.  Routing to provider to advise.    Yaneli Benítez BSN, RN

## 2021-03-03 RX ORDER — ESCITALOPRAM OXALATE 10 MG/1
TABLET ORAL
Qty: 30 TABLET | Refills: 0 | Status: SHIPPED | OUTPATIENT
Start: 2021-03-03 | End: 2021-04-02

## 2021-03-29 ASSESSMENT — ENCOUNTER SYMPTOMS
BREAST MASS: 0
DYSURIA: 0
SHORTNESS OF BREATH: 0
DIZZINESS: 0
COUGH: 0
EYE PAIN: 0
CONSTIPATION: 0
WEAKNESS: 0
FREQUENCY: 0
JOINT SWELLING: 0
PALPITATIONS: 0
NERVOUS/ANXIOUS: 1
HEARTBURN: 0
ABDOMINAL PAIN: 0
MYALGIAS: 0
HEMATOCHEZIA: 0
CHILLS: 0
FEVER: 0
SORE THROAT: 0
HEADACHES: 0
NAUSEA: 0
ARTHRALGIAS: 0
HEMATURIA: 0
PARESTHESIAS: 0
DIARRHEA: 0

## 2021-03-29 NOTE — PROGRESS NOTES
SUBJECTIVE:   CC: Annabelle Greene is an 50 year old woman who presents for preventive health visit.     Patient has been advised of split billing requirements and indicates understanding: Yes       New patient to me:      Healthy Habits:    MAMMO is due per pt and will help ward appt.    Pt is not fasting and did not have labs completed  No longer needs paps.    Not due for fasting labs. Had decent labs 2018.   Will get next year. Healthy weight.     Just got [place]     Answers for HPI/ROS submitted by the patient on 3/29/2021   Annual Exam:  Frequency of exercise:: 2-3 days/week  Getting at least 3 servings of Calcium per day:: Yes  Diet:: Regular (no restrictions)  Taking medications regularly:: Yes  Medication side effects:: None  Bi-annual eye exam:: NO  Dental care twice a year:: Yes  Sleep apnea or symptoms of sleep apnea:: None  abdominal pain: No  Blood in stool: No  Blood in urine: No  chest pain: No  chills: No  congestion: No  constipation: No  cough: No  diarrhea: No  dizziness: No  ear pain: No  eye pain: No  nervous/anxious: Yes  fever: No  frequency: No  genital sores: No  headaches: No  hearing loss: No  heartburn: No  arthralgias: No  joint swelling: No  peripheral edema: No  mood changes: No  myalgias: No  nausea: No  dysuria: No  palpitations: No  Skin sensation changes: No  sore throat: No  urgency: No  rash: No  shortness of breath: No  visual disturbance: Yes  weakness: No  pelvic pain: No  vaginal bleeding: No  vaginal discharge: No  tenderness: No  breast mass: No  breast discharge: No  Additional concerns today:: No  Duration of exercise:: 15-30 minutes      Depression and Anxiety Follow-Up    How are you doing with your depression since your last visit? Per pt feels like there is a little increase of anxiety now? But stable    How are you doing with your anxiety since your last visit?  Noted above    Are you having other symptoms that might be  associated with depression or anxiety? Yes:  still having a little anxiety sx.    Have you had a significant life event? No     Do you have any concerns with your use of alcohol or other drugs? No     On lexapro 10 mg and  Propanolol.  Using propanolol prn but needing more lately. Never been on 20 mg lexapro. Is interested. More general anxiety. Depression score isnt bad today. Imer is high. Denies suicidal or homicidal thoughts.  Patient instructed to go to the emergency room or call 911 if these occur.  No side effects from any medications.         Social History     Tobacco Use     Smoking status: Never Smoker     Smokeless tobacco: Never Used   Substance Use Topics     Alcohol use: Yes     Comment: occ. 2 drinks a week     Drug use: No     PHQ 9/12/2017 7/15/2020   PHQ-9 Total Score 5 13   Q9: Thoughts of better off dead/self-harm past 2 weeks Not at all Not at all     IMER-7 SCORE 5/18/2016 9/12/2017 7/15/2020   Total Score - - -   Total Score - - 16 (severe anxiety)   Total Score 5 15 16     Last PHQ-9 4/2/2021   1.  Little interest or pleasure in doing things 1   2.  Feeling down, depressed, or hopeless 1   3.  Trouble falling or staying asleep, or sleeping too much 0   4.  Feeling tired or having little energy 1   5.  Poor appetite or overeating 1   6.  Feeling bad about yourself 0   7.  Trouble concentrating 1   8.  Moving slowly or restless 1   Q9: Thoughts of better off dead/self-harm past 2 weeks 0   PHQ-9 Total Score 6   Difficulty at work, home, or with people Not difficult at all     IMER-7  4/2/2021   1. Feeling nervous, anxious, or on edge 2   2. Not being able to stop or control worrying 2   3. Worrying too much about different things 2   4. Trouble relaxing 2   5. Being so restless that it is hard to sit still 2   6. Becoming easily annoyed or irritable 1   7. Feeling afraid, as if something awful might happen 1   IMER-7 Total Score 12   If you checked any problems, how difficult have they made it for  you to do your work, take care of things at home, or get along with other people? Somewhat difficult       Suicide Assessment Five-step Evaluation and Treatment (SAFE-T)      Today's PHQ-2 Score:   PHQ-2 ( 1999 Pfizer) 3/29/2021 2/27/2018   Q1: Little interest or pleasure in doing things 1 0   Q2: Feeling down, depressed or hopeless 1 0   PHQ-2 Score 2 0   Q1: Little interest or pleasure in doing things Several days Not at all   Q2: Feeling down, depressed or hopeless Several days Not at all   PHQ-2 Score 2 0       Abuse: Current or Past(Physical, Sexual or Emotional)- No  Do you feel safe in your environment? Yes        Social History     Tobacco Use     Smoking status: Never Smoker     Smokeless tobacco: Never Used   Substance Use Topics     Alcohol use: Yes     Comment: occ. 2 drinks a week     If you drink alcohol do you typically have >3 drinks per day or >7 drinks per week? No                     Reviewed orders with patient.  Reviewed health maintenance and updated orders accordingly - Yes  Lab work is in process  Labs reviewed in EPIC  BP Readings from Last 3 Encounters:   04/02/21 120/80   03/03/19 134/67   02/27/18 109/70    Wt Readings from Last 3 Encounters:   04/02/21 62.1 kg (137 lb)   03/02/19 57.2 kg (126 lb)   02/27/18 68 kg (150 lb)                  Patient Active Problem List   Diagnosis     Nasal polyposis     Chronic sinus infection     CARDIOVASCULAR SCREENING; LDL GOAL LESS THAN 160     Generalized anxiety disorder     Past Surgical History:   Procedure Laterality Date     GENITOURINARY SURGERY      c section x 2     HYSTERECTOMY TOTAL ABDOMINAL       HYSTERECTOMY, PAP NO LONGER INDICATED       OPTICAL TRACKING SYSTEM ENDOSCOPIC SINUS SURGERY Bilateral 08/26/2016    Procedure: OPTICAL TRACKING SYSTEM ENDOSCOPIC SINUS SURGERY;  Surgeon: Govind Chester MD;  Location: [facility] OR     SINUS SURGERY      sinus x 4     SURGICAL HISTORY OF -   04/10/2008    SINUS SURGERY     TONSILLECTOMY          Social History     Tobacco Use     Smoking status: Never Smoker     Smokeless tobacco: Never Used   Substance Use Topics     Alcohol use: Yes     Comment: occ. 2 drinks a week     Family History   Problem Relation Age of Onset     Hypertension Mother          Current Outpatient Medications   Medication Sig Dispense Refill     azithromycin (ZITHROMAX) 250 MG tablet Take 1 tablet (250 mg) by mouth daily 6 tablet 6     escitalopram (LEXAPRO) 20 MG tablet Take 1 tablet (20 mg) by mouth daily Note increase in dose 90 tablet 0     propranolol (INDERAL) 20 MG tablet Take 1 tablet (20 mg) by mouth 3 times daily as needed (anxiety) 90 tablet 5     valACYclovir (VALTREX) 500 MG tablet Take 1 tablet (500 mg) by mouth 2 times daily 30 tablet 3     Allergies   Allergen Reactions     Sulfa Drugs Hives       Breast CA Risk Assessment (FHS-7) 3/29/2021   Do you have a family history of breast, colon, or ovarian cancer? No / Unknown       click delete button to remove this line now  Mammogram Screening: Recommended annual mammography  Pertinent mammograms are reviewed under the imaging tab.    Pertinent mammograms are reviewed under the imaging tab.  History of abnormal Pap smear: NO - age 30- 65 PAP every 3 years recommended     Reviewed and updated as needed this visit by clinical staff  Tobacco     Med Hx  Surg Hx  Fam Hx  Soc Hx        Reviewed and updated as needed this visit by Provider                Past Medical History:   Diagnosis Date     Chronic sinusitis       Past Surgical History:   Procedure Laterality Date     GENITOURINARY SURGERY      c section x 2     HYSTERECTOMY TOTAL ABDOMINAL       HYSTERECTOMY, PAP NO LONGER INDICATED       OPTICAL TRACKING SYSTEM ENDOSCOPIC SINUS SURGERY Bilateral 08/26/2016    Procedure: OPTICAL TRACKING SYSTEM ENDOSCOPIC SINUS SURGERY;  Surgeon: Govind Chester MD;  Location: [facility]     SINUS SURGERY      sinus x 4     SURGICAL HISTORY OF -   04/10/2008    SINUS SURGERY      "TONSILLECTOMY       OB History   No obstetric history on file.       ROS:  CONSTITUTIONAL: NEGATIVE for fever, chills, change in weight  INTEGUMENTARY/SKIN: NEGATIVE for worrisome rashes, moles or lesions  EYES: NEGATIVE for vision changes or irritation  ENT: NEGATIVE for ear, mouth and throat problems  RESP: NEGATIVE for significant cough or SOB  BREAST: NEGATIVE for masses, tenderness or discharge  CV: NEGATIVE for chest pain, palpitations or peripheral edema  GI: NEGATIVE for nausea, abdominal pain, heartburn, or change in bowel habits  : NEGATIVE for unusual urinary or vaginal symptoms. No vaginal bleeding.  MUSCULOSKELETAL: NEGATIVE for significant arthralgias or myalgia  NEURO: NEGATIVE for weakness, dizziness or paresthesias  PSYCHIATRIC: NEGATIVE for changes in mood or affect     OBJECTIVE:   /80   Pulse 75   Temp 97.2  F (36.2  C) (Tympanic)   Resp 14   Ht 1.626 m (5' 4\")   Wt 62.1 kg (137 lb)   SpO2 100%   Breastfeeding No   BMI 23.52 kg/m    EXAM:  GENERAL: healthy, alert and no distress  EYES: Eyes grossly normal to inspection, PERRL and conjunctivae and sclerae normal  HENT: ear canals and TM's normal, nose and mouth without ulcers or lesions  NECK: no adenopathy, no asymmetry, masses, or scars and thyroid normal to palpation  RESP: lungs clear to auscultation - no rales, rhonchi or wheezes  BREAST: normal without masses, tenderness or nipple discharge and no palpable axillary masses or adenopathy  CV: regular rate and rhythm, normal S1 S2, no S3 or S4, no murmur, click or rub, no peripheral edema and peripheral pulses strong  ABDOMEN: soft, nontender, no hepatosplenomegaly, no masses and bowel sounds normal  MS: no gross musculoskeletal defects noted, no edema  SKIN: no suspicious lesions or rashes  NEURO: Normal strength and tone, mentation intact and speech normal  PSYCH: mentation appears normal, affect normal/bright    Diagnostic Test Results:  Labs reviewed in " "Epic    ASSESSMENT/PLAN:   1. Routine general medical examination at a health care facility    - MA SCREENING DIGITAL BILAT - Future  (s+30); Future    2. Generalized anxiety disorder  Worsening  Increase to 20 mg lexapro  Call with concerns in 3 weeks or if not helping some in 3 weeks  Expectations discussed  Ok to take propanolo more since she feels it really helps calm her down  Consider other selective serotonin reuptake inhibitor or SNRI in future , therapy, or psych in future if needed  Recheck 1 month sooner if needed, if doing well can just be update via phone or mychart    - propranolol (INDERAL) 20 MG tablet; Take 1 tablet (20 mg) by mouth 3 times daily as needed (anxiety)  Dispense: 90 tablet; Refill: 5  - escitalopram (LEXAPRO) 20 MG tablet; Take 1 tablet (20 mg) by mouth daily Note increase in dose  Dispense: 90 tablet; Refill: 0    3. Recurrent cold sores  Stable, uses prn cold sores not often  - valACYclovir (VALTREX) 500 MG tablet; Take 1 tablet (500 mg) by mouth 2 times daily  Dispense: 30 tablet; Refill: 3    Patient has been advised of split billing requirements and indicates understanding: Yes  COUNSELING:   Reviewed preventive health counseling, as reflected in patient instructions       Regular exercise    Estimated body mass index is 21.63 kg/m  as calculated from the following:    Height as of 3/2/19: 1.626 m (5' 4\").    Weight as of 3/2/19: 57.2 kg (126 lb).        She reports that she has never smoked. She has never used smokeless tobacco.  Patient Instructions     Preventive Health Recommendations  Female Ages 40 to 49    Yearly exam:     See your health care provider every year in order to  1. Review health changes.   2. Discuss preventive care.    3. Review your medicines if your doctor prescribed any.      Get a Pap test every three years (unless you have an abnormal result and your provider advises testing more often).      If you get Pap tests with HPV test, you only need to test " every 5 years, unless you have an abnormal result. You do not need a Pap test if your uterus was removed (hysterectomy) and you have not had cancer.      You should be tested each year for STDs (sexually transmitted diseases), if you're at risk.     Ask your doctor if you should have a mammogram.      Have a colonoscopy (test for colon cancer) if someone in your family has had colon cancer or polyps before age 50.       Have a cholesterol test every 5 years.       Have a diabetes test (fasting glucose) after age 45. If you are at risk for diabetes, you should have this test every 3 years.    Shots: Get a flu shot each year. Get a tetanus shot every 10 years.     Nutrition:     Eat at least 5 servings of fruits and vegetables each day.    Eat whole-grain bread, whole-wheat pasta and brown rice instead of white grains and rice.    Get adequate Calcium and Vitamin D.      Lifestyle    Exercise at least 150 minutes a week (an average of 30 minutes a day, 5 days a week). This will help you control your weight and prevent disease.    Limit alcohol to one drink per day.    No smoking.     Wear sunscreen to prevent skin cancer.    See your dentist every six months for an exam and cleaning.        Counseling Resources:  ATP IV Guidelines  Pooled Cohorts Equation Calculator  Breast Cancer Risk Calculator  BRCA-Related Cancer Risk Assessment: FHS-7 Tool  FRAX Risk Assessment  ICSI Preventive Guidelines  Dietary Guidelines for Americans, 2010  USDA's MyPlate  ASA Prophylaxis  Lung CA Screening    ISIAH Mendez [facility]

## 2021-04-02 ENCOUNTER — OFFICE VISIT (OUTPATIENT)
Dept: FAMILY MEDICINE | Facility: CLINIC | Age: 51
End: 2021-04-02
Payer: COMMERCIAL

## 2021-04-02 VITALS
RESPIRATION RATE: 14 BRPM | TEMPERATURE: 97.2 F | HEIGHT: 64 IN | OXYGEN SATURATION: 100 % | DIASTOLIC BLOOD PRESSURE: 80 MMHG | SYSTOLIC BLOOD PRESSURE: 120 MMHG | HEART RATE: 75 BPM | BODY MASS INDEX: 23.39 KG/M2 | WEIGHT: 137 LBS

## 2021-04-02 DIAGNOSIS — B00.1 RECURRENT COLD SORES: ICD-10-CM

## 2021-04-02 DIAGNOSIS — F41.1 GENERALIZED ANXIETY DISORDER: ICD-10-CM

## 2021-04-02 DIAGNOSIS — Z00.00 ROUTINE GENERAL MEDICAL EXAMINATION AT A HEALTH CARE FACILITY: Primary | ICD-10-CM

## 2021-04-02 PROCEDURE — 99396 PREV VISIT EST AGE 40-64: CPT | Performed by: PHYSICIAN ASSISTANT

## 2021-04-02 PROCEDURE — 99214 OFFICE O/P EST MOD 30 MIN: CPT | Mod: 25 | Performed by: PHYSICIAN ASSISTANT

## 2021-04-02 PROCEDURE — 96127 BRIEF EMOTIONAL/BEHAV ASSMT: CPT | Performed by: PHYSICIAN ASSISTANT

## 2021-04-02 RX ORDER — PROPRANOLOL HYDROCHLORIDE 20 MG/1
20 TABLET ORAL 3 TIMES DAILY PRN
Qty: 90 TABLET | Refills: 5 | Status: SHIPPED | OUTPATIENT
Start: 2021-04-02 | End: 2022-06-21

## 2021-04-02 RX ORDER — ESCITALOPRAM OXALATE 20 MG/1
20 TABLET ORAL DAILY
Qty: 90 TABLET | Refills: 0 | Status: SHIPPED | OUTPATIENT
Start: 2021-04-02 | End: 2021-06-21

## 2021-04-02 RX ORDER — VALACYCLOVIR HYDROCHLORIDE 500 MG/1
500 TABLET, FILM COATED ORAL 2 TIMES DAILY
Qty: 30 TABLET | Refills: 3 | Status: SHIPPED | OUTPATIENT
Start: 2021-04-02 | End: 2022-06-21

## 2021-04-02 ASSESSMENT — ANXIETY QUESTIONNAIRES
6. BECOMING EASILY ANNOYED OR IRRITABLE: SEVERAL DAYS
GAD7 TOTAL SCORE: 12
7. FEELING AFRAID AS IF SOMETHING AWFUL MIGHT HAPPEN: SEVERAL DAYS
5. BEING SO RESTLESS THAT IT IS HARD TO SIT STILL: MORE THAN HALF THE DAYS
1. FEELING NERVOUS, ANXIOUS, OR ON EDGE: MORE THAN HALF THE DAYS
2. NOT BEING ABLE TO STOP OR CONTROL WORRYING: MORE THAN HALF THE DAYS
3. WORRYING TOO MUCH ABOUT DIFFERENT THINGS: MORE THAN HALF THE DAYS
IF YOU CHECKED OFF ANY PROBLEMS ON THIS QUESTIONNAIRE, HOW DIFFICULT HAVE THESE PROBLEMS MADE IT FOR YOU TO DO YOUR WORK, TAKE CARE OF THINGS AT HOME, OR GET ALONG WITH OTHER PEOPLE: SOMEWHAT DIFFICULT

## 2021-04-02 ASSESSMENT — PATIENT HEALTH QUESTIONNAIRE - PHQ9
SUM OF ALL RESPONSES TO PHQ QUESTIONS 1-9: 6
5. POOR APPETITE OR OVEREATING: MORE THAN HALF THE DAYS

## 2021-04-02 ASSESSMENT — MIFFLIN-ST. JEOR: SCORE: 1231.43

## 2021-04-02 NOTE — LETTER
My Depression Action Plan  Name: Annabelle Greene   Date of Birth 1970  Date: 3/29/2021    My doctor: Kehr, Kristen M   My clinic: [facility]  [address] [address] [phone]          GREEN    ZONE   Good Control    What it looks like:     Things are going generally well. You have normal ups and downs. You may even feel depressed from time to time, but bad moods usually last less than a day.   What you need to do:  1. Continue to care for yourself (see self care plan)  2. Check your depression survival kit and update it as needed  3. Follow your physician s recommendations including any medication.  4. Do not stop taking medication unless you consult with your physician first.           YELLOW         ZONE Getting Worse    What it looks like:     Depression is starting to interfere with your life.     It may be hard to get out of bed; you may be starting to isolate yourself from others.    Symptoms of depression are starting to last most all day and this has happened for several days.     You may have suicidal thoughts but they are not constant.   What you need to do:     1. Call your care team. Your response to treatment will improve if you keep your care team informed of your progress. Yellow periods are signs an adjustment may need to be made.     2. Continue your self-care.  Just get dressed and ready for the day.  Don't give yourself time to talk yourself out of it.    3. Talk to someone in your support network.    4. Open up your Depression Self-Care Plan/Wellness Kit.           RED    ZONE Medical Alert - Get Help    What it looks like:     Depression is seriously interfering with your life.     You may experience these or other symptoms: You can t get out of bed most days, can t work or engage in other necessary activities, you have trouble taking care of basic hygiene, or basic responsibilities, thoughts of suicide or death that will not go  away, self-injurious behavior.     What you need to do:  1. Call your care team and request a same-day appointment. If they are not available (weekends or after hours) call your local crisis line, emergency room or 911.          Depression Self-Care Plan / Wellness Kit    Many people find that medication and therapy are helpful treatments for managing depression. In addition, making small changes to your everyday life can help to boost your mood and improve your wellbeing. Below are some tips for you to consider. Be sure to talk with your medical provider and/or behavioral health consultant if your symptoms are worsening or not improving.     Sleep   Sleep hygiene  means all of the habits that support good, restful sleep. It includes maintaining a consistent bedtime and wake time, using your bedroom only for sleeping or sex, and keeping the bedroom dark and free of distractions like a computer, smartphone, or television.     Develop a Healthy Routine  Maintain good hygiene. Get out of bed in the morning, make your bed, brush your teeth, take a shower, and get dressed. Don t spend too much time viewing media that makes you feel stressed. Find time to relax each day.    Exercise  Get some form of exercise every day. This will help reduce pain and release endorphins, the  feel good  chemicals in your brain. It can be as simple as just going for a walk or doing some gardening, anything that will get you moving.      Diet  Strive to eat healthy foods, including fruits and vegetables. Drink plenty of water. Avoid excessive sugar, caffeine, alcohol, and other mood-altering substances.     Stay Connected with Others  Stay in touch with friends and family members.    Manage Your Mood  Try deep breathing, massage therapy, biofeedback, or meditation. Take part in fun activities when you can. Try to find something to smile about each day.     Psychotherapy  Be open to working with a therapist if your provider recommends it.      Medication  Be sure to take your medication as prescribed. Most anti-depressants need to be taken every day. It usually takes several weeks for medications to work. Not all medicines work for all people. It is important to follow-up with your provider to make sure you have a treatment plan that is working for you. Do not stop your medication abruptly without first discussing it with your provider.    Crisis Resources   These hotlines are for both adults and children. They and are open 24 hours a day, 7 days a week unless noted otherwise.      National Suicide Prevention Lifeline   0-475-147-TALK (4596)      Crisis Text Line    www.crisistextline.org  Text HOME to 797702 from anywhere in the [place], anytime, about any type of crisis. A live, trained crisis counselor will receive the text and respond quickly.      [facility] Lifeline for LGBTQ Youth  A national crisis intervention and suicide lifeline for LGBTQ youth under 25. Provides a safe place to talk without judgement. Call 1-657.941.5929; text START to 787270 or visit www.thetrevorproject.org to talk to a trained counselor.      For [facility] crisis numbers, visit the [facility] website at:  https://[place].gov/dhs/people-we-serve/adults/health-care/mental-health/resources/crisis-contacts.jsp

## 2021-04-03 ASSESSMENT — ANXIETY QUESTIONNAIRES: GAD7 TOTAL SCORE: 12

## 2021-04-14 ENCOUNTER — APPOINTMENT (OUTPATIENT)
Dept: URBAN - METROPOLITAN AREA CLINIC 252 | Age: 51
Setting detail: DERMATOLOGY
End: 2021-04-14

## 2021-04-14 DIAGNOSIS — Z41.9 ENCOUNTER FOR PROCEDURE FOR PURPOSES OTHER THAN REMEDYING HEALTH STATE, UNSPECIFIED: ICD-10-CM

## 2021-04-14 PROCEDURE — OTHER BOTOX (U OR CC): OTHER

## 2021-04-14 NOTE — PROCEDURE: BOTOX (U OR CC)
Post-Care Instructions: - Skin was cleansed with 70% isopropyl alcohol prior to injection today.\\n- Patient was instructed to not lie down for touch area for 6 hrs.\\n\\nTOTAL BOTOX USED TODAY: 34 units\\n\\nCOLOR KEY FOR DIAGRAM BELOW:\\nRed = 1/2 unit of Botox per injection\\nOrange = 1 units of Botox per injection\\nYellow = 2 units of Botox per injection\\nGreen = 3 units of Botox per injection\\nBlue = 4 units of Botox per injection\\nBrown = 5 units of Botox per injection\\nBlack = 6 units of Botox per injection

## 2021-04-14 NOTE — PROCEDURE: BOTOX (U OR CC)
Price (Use Numbers Only, No Special Characters Or $): 943 Price (Use Numbers Only, No Special Characters Or $): 037

## 2021-08-02 ENCOUNTER — APPOINTMENT (OUTPATIENT)
Dept: URBAN - METROPOLITAN AREA CLINIC 252 | Age: 51
Setting detail: DERMATOLOGY
End: 2021-08-02

## 2021-08-02 DIAGNOSIS — Z41.9 ENCOUNTER FOR PROCEDURE FOR PURPOSES OTHER THAN REMEDYING HEALTH STATE, UNSPECIFIED: ICD-10-CM

## 2021-08-02 PROCEDURE — OTHER BOTOX (U OR CC): OTHER

## 2021-08-02 NOTE — PROCEDURE: BOTOX (U OR CC)
Price (Use Numbers Only, No Special Characters Or $): 159 Price (Use Numbers Only, No Special Characters Or $): 573

## 2021-08-06 ENCOUNTER — MYC MEDICAL ADVICE (OUTPATIENT)
Dept: FAMILY MEDICINE | Facility: CLINIC | Age: 51
End: 2021-08-06

## 2021-08-06 DIAGNOSIS — Z12.11 SPECIAL SCREENING FOR MALIGNANT NEOPLASMS, COLON: Primary | ICD-10-CM

## 2021-08-12 ENCOUNTER — ANCILLARY PROCEDURE (OUTPATIENT)
Dept: MAMMOGRAPHY | Facility: CLINIC | Age: 51
End: 2021-08-12
Attending: PHYSICIAN ASSISTANT
Payer: COMMERCIAL

## 2021-08-12 DIAGNOSIS — Z00.00 ROUTINE GENERAL MEDICAL EXAMINATION AT A HEALTH CARE FACILITY: ICD-10-CM

## 2021-08-12 PROCEDURE — 77067 SCR MAMMO BI INCL CAD: CPT | Mod: TC | Performed by: RADIOLOGY

## 2021-08-12 PROCEDURE — 77063 BREAST TOMOSYNTHESIS BI: CPT | Mod: TC | Performed by: RADIOLOGY

## 2021-08-17 ENCOUNTER — TELEPHONE (OUTPATIENT)
Dept: GASTROENTEROLOGY | Facility: CLINIC | Age: 51
End: 2021-08-17

## 2021-08-17 DIAGNOSIS — Z11.59 ENCOUNTER FOR SCREENING FOR OTHER VIRAL DISEASES: ICD-10-CM

## 2021-08-17 NOTE — TELEPHONE ENCOUNTER
Screening Questions  1. Are you active on mychart?    2. What insurance is in the chart? [employer]    2.  Ordering/Referring Provider: Ilana Ball PA-C in [facility]    3. BMI 23.52    4. Are you on daily home oxygen? No     5. Do you have a history of difficult airway? No     6. Have you had a heart, lung, or liver transplant? no    7. Are you currently on dialysis? No     8. Have you had a stroke or Transient ischemic atttack (TIA) within 6 months? No     9. In the past 6 months, have you had any heart related issues including cardiomyopathy or heart attack?         If yes, did it require cardiac stenting or other implantable device? No     10. Do you have any implantable devices in your body (pacemaker, defib, LVAD)? No     11. Do you take nitroglycerin? If yes, how often? No     12. Are you currently taking any blood thinners? No     13. Are you a diabetic? No     14. (Females) Are you currently pregnant? No   If yes, how many weeks?    15. Have you had a procedure in the past that was difficult to tolerate with conscious sedation? Any allergies to Fentanyl or Versed  No     16. Are you taking any scheduled prescription narcotics more than once daily?  No     17. Do you have any chemical dependencies such as alcohol, street drugs, or methadone?  No     18. Do you have any history of post-traumatic stress syndrome or mental health issues? No     19. Do you transfer independently? yes    20.  Do you have any issues with constipation? No     21. Preferred Pharmacy for Pre Prescription [facility] [place] [place]     Scheduling Details    Which Colonoscopy Prep was Sent?:   Procedure Scheduled: Colonoscopy  Provider/Surgeon: Jeison  Date of [place]: 8/27/21  [place] [facility] [place] (Please ask for phone number if not scheduled by patient): PATIENT      Sedation Type: C.SEDATION  Conscious Sedation- Needs  for 6 hours after the procedure  MAC/General-Needs  for 24  hours after procedure    Pre-op Required at [facility] [place], [place] and OR for MAC sedation:   (if yes advise patient they will need a pre-op prior to procedure)      Is patient on blood thinners? -N (If yes- inform patient to follow up with PCP or provider for follow up instructions)     Informed patient they will need an adult  YES   Cannot take any type of public or medical transportation alone    Informed Patient of COVID Test Requirement YES-SCHEDULED     Confirmed Nurse will call to complete assessment YES     Additional comments:

## 2021-08-20 ASSESSMENT — MIFFLIN-ST. JEOR: SCORE: 1222.36

## 2021-08-24 ENCOUNTER — LAB (OUTPATIENT)
Dept: LAB | Facility: CLINIC | Age: 51
End: 2021-08-24
Payer: COMMERCIAL

## 2021-08-24 DIAGNOSIS — Z11.59 ENCOUNTER FOR SCREENING FOR OTHER VIRAL DISEASES: ICD-10-CM

## 2021-08-24 PROCEDURE — U0005 INFEC AGEN DETEC AMPLI PROBE: HCPCS

## 2021-08-24 PROCEDURE — U0003 INFECTIOUS AGENT DETECTION BY NUCLEIC ACID (DNA OR RNA); SEVERE ACUTE RESPIRATORY SYNDROME CORONAVIRUS 2 (SARS-COV-2) (CORONAVIRUS DISEASE [COVID-19]), AMPLIFIED PROBE TECHNIQUE, MAKING USE OF HIGH THROUGHPUT TECHNOLOGIES AS DESCRIBED BY CMS-2020-01-R: HCPCS

## 2021-08-25 LAB — SARS-COV-2 RNA RESP QL NAA+PROBE: NEGATIVE

## 2021-08-26 RX ORDER — NALOXONE HYDROCHLORIDE 0.4 MG/ML
0.4 INJECTION, SOLUTION INTRAMUSCULAR; INTRAVENOUS; SUBCUTANEOUS
Status: CANCELLED | OUTPATIENT
Start: 2021-08-26

## 2021-08-26 RX ORDER — FLUMAZENIL 0.1 MG/ML
0.2 INJECTION, SOLUTION INTRAVENOUS
Status: CANCELLED | OUTPATIENT
Start: 2021-08-26 | End: 2021-08-27

## 2021-08-26 RX ORDER — ONDANSETRON 2 MG/ML
4 INJECTION INTRAMUSCULAR; INTRAVENOUS EVERY 6 HOURS PRN
Status: CANCELLED | OUTPATIENT
Start: 2021-08-26

## 2021-08-26 RX ORDER — NALOXONE HYDROCHLORIDE 0.4 MG/ML
0.2 INJECTION, SOLUTION INTRAMUSCULAR; INTRAVENOUS; SUBCUTANEOUS
Status: CANCELLED | OUTPATIENT
Start: 2021-08-26

## 2021-08-26 RX ORDER — ONDANSETRON 4 MG/1
4 TABLET, ORALLY DISINTEGRATING ORAL EVERY 6 HOURS PRN
Status: CANCELLED | OUTPATIENT
Start: 2021-08-26

## 2021-08-26 RX ORDER — PROCHLORPERAZINE MALEATE 10 MG
10 TABLET ORAL EVERY 6 HOURS PRN
Status: CANCELLED | OUTPATIENT
Start: 2021-08-26

## 2021-08-27 ENCOUNTER — HOSPITAL ENCOUNTER (OUTPATIENT)
Facility: AMBULATORY SURGERY CENTER | Age: 51
Discharge: HOME | End: 2021-08-27
Attending: INTERNAL MEDICINE | Admitting: INTERNAL MEDICINE
Payer: COMMERCIAL

## 2021-08-27 VITALS
RESPIRATION RATE: 16 BRPM | OXYGEN SATURATION: 97 % | HEIGHT: 64 IN | HEART RATE: 73 BPM | SYSTOLIC BLOOD PRESSURE: 121 MMHG | TEMPERATURE: 97.3 F | BODY MASS INDEX: 23.05 KG/M2 | WEIGHT: 135 LBS | DIASTOLIC BLOOD PRESSURE: 86 MMHG

## 2021-08-27 LAB — COLONOSCOPY: NORMAL

## 2021-08-27 PROCEDURE — 45385 COLONOSCOPY W/LESION REMOVAL: CPT

## 2021-08-27 PROCEDURE — 88305 TISSUE EXAM BY PATHOLOGIST: CPT | Performed by: PATHOLOGY

## 2021-08-27 PROCEDURE — G8907 PT DOC NO EVENTS ON DISCHARG: HCPCS

## 2021-08-27 PROCEDURE — G8918 PT W/O PREOP ORDER IV AB PRO: HCPCS

## 2021-08-27 RX ORDER — ONDANSETRON 2 MG/ML
4 INJECTION INTRAMUSCULAR; INTRAVENOUS
Status: COMPLETED | OUTPATIENT
Start: 2021-08-27 | End: 2021-08-27

## 2021-08-27 RX ORDER — LIDOCAINE 40 MG/G
CREAM TOPICAL
Status: DISCONTINUED | OUTPATIENT
Start: 2021-08-27 | End: 2021-08-28 | Stop reason: HOSPADM

## 2021-08-27 RX ORDER — FENTANYL CITRATE 50 UG/ML
INJECTION, SOLUTION INTRAMUSCULAR; INTRAVENOUS PRN
Status: DISCONTINUED | OUTPATIENT
Start: 2021-08-27 | End: 2021-08-27 | Stop reason: HOSPADM

## 2021-08-27 RX ORDER — SODIUM CHLORIDE, SODIUM LACTATE, POTASSIUM CHLORIDE, CALCIUM CHLORIDE 600; 310; 30; 20 MG/100ML; MG/100ML; MG/100ML; MG/100ML
INJECTION, SOLUTION INTRAVENOUS CONTINUOUS
Status: DISCONTINUED | OUTPATIENT
Start: 2021-08-27 | End: 2021-08-28 | Stop reason: HOSPADM

## 2021-08-27 RX ADMIN — SODIUM CHLORIDE, SODIUM LACTATE, POTASSIUM CHLORIDE, CALCIUM CHLORIDE: 600; 310; 30; 20 INJECTION, SOLUTION INTRAVENOUS at 11:15

## 2021-08-27 RX ADMIN — ONDANSETRON 4 MG: 2 INJECTION INTRAMUSCULAR; INTRAVENOUS at 11:14

## 2021-08-31 LAB
PATH REPORT.COMMENTS IMP SPEC: NORMAL
PATH REPORT.COMMENTS IMP SPEC: NORMAL
PATH REPORT.FINAL DX SPEC: NORMAL
PATH REPORT.GROSS SPEC: NORMAL
PATH REPORT.MICROSCOPIC SPEC OTHER STN: NORMAL
PATH REPORT.RELEVANT HX SPEC: NORMAL
PHOTO IMAGE: NORMAL

## 2021-09-26 ENCOUNTER — HEALTH MAINTENANCE LETTER (OUTPATIENT)
Age: 51
End: 2021-09-26

## 2021-11-02 ENCOUNTER — APPOINTMENT (OUTPATIENT)
Dept: URBAN - METROPOLITAN AREA CLINIC 252 | Age: 51
Setting detail: DERMATOLOGY
End: 2021-11-08

## 2021-11-02 DIAGNOSIS — Z41.9 ENCOUNTER FOR PROCEDURE FOR PURPOSES OTHER THAN REMEDYING HEALTH STATE, UNSPECIFIED: ICD-10-CM

## 2021-11-02 PROCEDURE — OTHER BOTOX (U OR CC): OTHER

## 2021-11-02 NOTE — PROCEDURE: BOTOX (U OR CC)
Price (Use Numbers Only, No Special Characters Or $): 905 Price (Use Numbers Only, No Special Characters Or $): 658

## 2021-11-02 NOTE — PROCEDURE: BOTOX (U OR CC)
Post-Care Instructions: - Skin was cleansed with 70% isopropyl alcohol prior to injection today.\\n- Patient was instructed to not lie down for touch area for 6 hrs.\\n\\nTOTAL BOTOX USED TODAY: 38 units\\n\\nCOLOR KEY FOR DIAGRAM BELOW:\\nRed = 1/2 unit of Botox per injection\\nOrange = 1 units of Botox per injection\\nYellow = 2 units of Botox per injection\\nGreen = 3 units of Botox per injection\\nBlue = 4 units of Botox per injection\\nBrown = 5 units of Botox per injection\\nBlack = 6 units of Botox per injection

## 2021-12-07 ENCOUNTER — OFFICE VISIT (OUTPATIENT)
Dept: URGENT CARE | Facility: URGENT CARE | Age: 51
End: 2021-12-07
Payer: COMMERCIAL

## 2021-12-07 VITALS
BODY MASS INDEX: 24.27 KG/M2 | WEIGHT: 141.4 LBS | DIASTOLIC BLOOD PRESSURE: 84 MMHG | HEART RATE: 68 BPM | OXYGEN SATURATION: 98 % | SYSTOLIC BLOOD PRESSURE: 144 MMHG | TEMPERATURE: 98.6 F

## 2021-12-07 DIAGNOSIS — R30.0 DYSURIA: Primary | ICD-10-CM

## 2021-12-07 DIAGNOSIS — N30.01 ACUTE CYSTITIS WITH HEMATURIA: ICD-10-CM

## 2021-12-07 LAB
ALBUMIN UR-MCNC: NEGATIVE MG/DL
APPEARANCE UR: ABNORMAL
BACTERIA #/AREA URNS HPF: ABNORMAL /HPF
BILIRUB UR QL STRIP: NEGATIVE
COLOR UR AUTO: YELLOW
GLUCOSE UR STRIP-MCNC: NEGATIVE MG/DL
HGB UR QL STRIP: ABNORMAL
KETONES UR STRIP-MCNC: ABNORMAL MG/DL
LEUKOCYTE ESTERASE UR QL STRIP: ABNORMAL
NITRATE UR QL: NEGATIVE
PH UR STRIP: 7 [PH] (ref 5–7)
RBC #/AREA URNS AUTO: ABNORMAL /HPF
SP GR UR STRIP: <=1.005 (ref 1–1.03)
SQUAMOUS #/AREA URNS AUTO: ABNORMAL /LPF
UROBILINOGEN UR STRIP-ACNC: 0.2 E.U./DL
WBC #/AREA URNS AUTO: ABNORMAL /HPF

## 2021-12-07 PROCEDURE — 81001 URINALYSIS AUTO W/SCOPE: CPT | Performed by: NURSE PRACTITIONER

## 2021-12-07 PROCEDURE — 99213 OFFICE O/P EST LOW 20 MIN: CPT | Performed by: NURSE PRACTITIONER

## 2021-12-07 PROCEDURE — 87086 URINE CULTURE/COLONY COUNT: CPT | Performed by: NURSE PRACTITIONER

## 2021-12-07 RX ORDER — NITROFURANTOIN 25; 75 MG/1; MG/1
100 CAPSULE ORAL 2 TIMES DAILY
Qty: 10 CAPSULE | Refills: 0 | Status: SHIPPED | OUTPATIENT
Start: 2021-12-07 | End: 2021-12-12

## 2021-12-07 ASSESSMENT — ENCOUNTER SYMPTOMS
HEMATURIA: 1
ABDOMINAL PAIN: 1
FREQUENCY: 1
DYSURIA: 1

## 2021-12-07 NOTE — PATIENT INSTRUCTIONS

## 2021-12-07 NOTE — PROGRESS NOTES
SUBJECTIVE:   Annabelle Greene is a 51 year old female presenting with a chief complaint of   Chief Complaint   Patient presents with     Dysuria     Started about 2 days ago started getting worse last night.  Frequency, burning, cloudy, pain in abdominal and lower back, some blood in urine started today, HA.       She is an established patient of [facility].    UTI    Onset of symptoms was 2day(s).  Course of illness is worsening  Severity moderate  Current and associated symptoms dysuria, frequency, blood in urine, suprapubic pain and pressure and back pain  Treatment and measures tried None  Predisposing factors include none  Patient denies rigors, flank pain, temperature > 101 degrees F. and vomiting          Review of Systems   Gastrointestinal: Positive for abdominal pain.   Genitourinary: Positive for dysuria, frequency and hematuria.   All other systems reviewed and are negative.      Past Medical History:   Diagnosis Date     Chronic sinusitis      Family History   Problem Relation Age of Onset     Hypertension Mother      Current Outpatient Medications   Medication Sig Dispense Refill     escitalopram (LEXAPRO) 20 MG tablet TAKE 1 TABLET(20 MG) BY MOUTH DAILY. INCREASE IN DOSE 90 tablet 2     nitroFURantoin macrocrystal-monohydrate (MACROBID) 100 MG capsule Take 1 capsule (100 mg) by mouth 2 times daily for 5 days 10 capsule 0     propranolol (INDERAL) 20 MG tablet Take 1 tablet (20 mg) by mouth 3 times daily as needed (anxiety) 90 tablet 5     azithromycin (ZITHROMAX) 250 MG tablet Take 1 tablet (250 mg) by mouth daily (Patient not taking: Reported on 12/7/2021) 6 tablet 6     valACYclovir (VALTREX) 500 MG tablet Take 1 tablet (500 mg) by mouth 2 times daily (Patient not taking: Reported on 12/7/2021) 30 tablet 3     Social History     Tobacco Use     Smoking status: Never Smoker     Smokeless tobacco: Never Used   Substance Use Topics     Alcohol use: Yes     Comment: occ. 2 drinks a week        OBJECTIVE  BP (!) 144/84   Pulse 68   Temp 98.6  F (37  C) (Tympanic)   Wt 64.1 kg (141 lb 6.4 oz)   SpO2 98%   BMI 24.27 kg/m      Physical Exam  Cardiovascular:      Rate and Rhythm: Normal rate.   Pulmonary:      Effort: Pulmonary effort is normal.   Abdominal:      Tenderness: There is no right CVA tenderness or left CVA tenderness.   Neurological:      General: No focal deficit present.      Mental Status: She is alert.         Labs:  Results for orders placed or performed in visit on 12/07/21 (from the past 24 hour(s))   UA macro with reflex to Microscopic and Culture - Clinc Collect    Specimen: Urine, Clean Catch   Result Value Ref Range    Color Urine Yellow Colorless, Straw, Light Yellow, Yellow    Appearance Urine Slightly Cloudy (A) Clear    Glucose Urine Negative Negative mg/dL    Bilirubin Urine Negative Negative    Ketones Urine Trace (A) Negative mg/dL    Specific Gravity Urine <=1.005 1.003 - 1.035    Blood Urine Large (A) Negative    pH Urine 7.0 5.0 - 7.0    Protein Albumin Urine Negative Negative mg/dL    Urobilinogen Urine 0.2 0.2, 1.0 E.U./dL    Nitrite Urine Negative Negative    Leukocyte Esterase Urine Large (A) Negative   Urine Microscopic Exam   Result Value Ref Range    Bacteria Urine Few (A) None Seen /HPF    RBC Urine 5-10 (A) 0-2 /HPF /HPF    WBC Urine 25-50 (A) 0-5 /HPF /HPF    Squamous Epithelials Urine Few (A) None Seen /LPF     ASSESSMENT:      ICD-10-CM    1. Dysuria  R30.0 UA macro with reflex to Microscopic and Culture - Clinc Collect     Urine Microscopic Exam     Urine Culture   2. Acute cystitis with hematuria  N30.01 nitroFURantoin macrocrystal-monohydrate (MACROBID) 100 MG capsule      PLAN:   ABD: soft, no tenderness to palpation , no rigidity, guarding or rebound . No CVAT  As per ordered above.  Drink plenty of fluids.  Prevention and treatment of UTI's discussed. Follow up with primary care physician if not improving.  Advised about symptoms which might herald  more serious problems.

## 2021-12-08 LAB — BACTERIA UR CULT: NO GROWTH

## 2021-12-21 DIAGNOSIS — F41.1 GENERALIZED ANXIETY DISORDER: ICD-10-CM

## 2021-12-21 RX ORDER — ESCITALOPRAM OXALATE 20 MG/1
TABLET ORAL
Qty: 90 TABLET | Refills: 2 | OUTPATIENT
Start: 2021-12-21

## 2022-02-22 ENCOUNTER — APPOINTMENT (OUTPATIENT)
Dept: URBAN - METROPOLITAN AREA CLINIC 252 | Age: 52
Setting detail: DERMATOLOGY
End: 2022-02-22

## 2022-02-22 DIAGNOSIS — Z41.9 ENCOUNTER FOR PROCEDURE FOR PURPOSES OTHER THAN REMEDYING HEALTH STATE, UNSPECIFIED: ICD-10-CM

## 2022-02-22 PROCEDURE — OTHER BOTOX (U OR CC): OTHER

## 2022-02-22 NOTE — PROCEDURE: BOTOX (U OR CC)
Price (Use Numbers Only, No Special Characters Or $): 040 Price (Use Numbers Only, No Special Characters Or $): 160

## 2022-03-26 DIAGNOSIS — F41.1 GENERALIZED ANXIETY DISORDER: ICD-10-CM

## 2022-03-28 RX ORDER — ESCITALOPRAM OXALATE 20 MG/1
TABLET ORAL
Qty: 90 TABLET | Refills: 0 | OUTPATIENT
Start: 2022-03-28

## 2022-04-05 DIAGNOSIS — J32.4 CHRONIC PANSINUSITIS: ICD-10-CM

## 2022-04-06 RX ORDER — AZITHROMYCIN 250 MG/1
250 TABLET, FILM COATED ORAL DAILY
Qty: 6 TABLET | Refills: 6 | Status: SHIPPED | OUTPATIENT
Start: 2022-04-06 | End: 2022-06-21

## 2022-05-08 ENCOUNTER — HEALTH MAINTENANCE LETTER (OUTPATIENT)
Age: 52
End: 2022-05-08

## 2022-05-24 ENCOUNTER — APPOINTMENT (OUTPATIENT)
Dept: URBAN - METROPOLITAN AREA CLINIC 252 | Age: 52
Setting detail: DERMATOLOGY
End: 2022-05-26

## 2022-05-24 DIAGNOSIS — Z41.9 ENCOUNTER FOR PROCEDURE FOR PURPOSES OTHER THAN REMEDYING HEALTH STATE, UNSPECIFIED: ICD-10-CM

## 2022-05-24 PROCEDURE — OTHER BOTOX (U OR CC): OTHER

## 2022-05-24 NOTE — PROCEDURE: BOTOX (U OR CC)
Price (Use Numbers Only, No Special Characters Or $): 500 Price (Use Numbers Only, No Special Characters Or $): 707

## 2022-06-13 NOTE — PROGRESS NOTES
SUBJECTIVE:   CC: Annabelle Greene is an 51 year old woman who presents for preventive health visit.     Patient has been advised of split billing requirements and indicates understanding: Yes     Answers for HPI/ROS submitted by the patient on 6/14/2022  If you checked off any problems, how difficult have these problems made it for you to do your work, take care of things at home, or get along with other people?: Not difficult at all  PHQ9 TOTAL SCORE: 3    mammo- due in 2 months.   Fasting labs due.   Up to date on colonoscopy.   Pap discontinued.     SH-two adult kids. Daughter applying to pa school.   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     med check:  Anxiety-Last year we upped lexapro to 20 mg as she was needing propanolol prn more. Since then she hasn't felt much of a difference and would like to try weaning down on lexapro and use propanolo more.   Denies suicidal or homicidal thoughts.  Patient instructed to go to the emergency room or call 911 if these occur.    Valtrex- prn cold sore      Pt is not fasting and did not have labs completed.    - L big toe pt would like to have looked at.  Years ago dropped a can on it. Now it has a bump. Pain sometimes. Not much discoloration more the shape. Seems ingrown.     Healthy Habits:     Getting at least 3 servings of Calcium per day:  NO    Bi-annual eye exam:  NO    Dental care twice a year:  Yes    Sleep apnea or symptoms of sleep apnea:  None    Diet:  Carbohydrate counting    Frequency of exercise:  4-5 days/week    Duration of exercise:  30-45 minutes    Taking medications regularly:  Yes    Medication side effects:  None    PHQ-2 Total Score: 0    Additional concerns today:  Yes                 Today's PHQ-2 Score:   PHQ-2 ( 1999 [employer]) 6/14/2022   Q1: Little interest or pleasure in doing things 0   Q2: Feeling down, depressed or hopeless 0   PHQ-2 Score 0   PHQ-2 Total Score (12-17 Years)- Positive if 3 or more points; Administer PHQ-A if  positive -   Q1: Little interest or pleasure in doing things Not at all   Q2: Feeling down, depressed or hopeless Not at all   PHQ-2 Score 0       Abuse: Current or Past (Physical, Sexual or Emotional) - No  Do you feel safe in your environment? Yes        Social History     Tobacco Use     Smoking status: Never Smoker     Smokeless tobacco: Never Used   Substance Use Topics     Alcohol use: Yes     Comment: occ. 2 drinks a week     If you drink alcohol do you typically have >3 drinks per day or >7 drinks per week? No    No flowsheet data found.    Reviewed orders with patient.  Reviewed health maintenance and updated orders accordingly - Yes  Lab work is in process  Labs reviewed in EPIC  BP Readings from Last 3 Encounters:   06/21/22 136/80   12/07/21 (!) 144/84   08/27/21 121/86    Wt Readings from Last 3 Encounters:   06/21/22 64.4 kg (142 lb)   12/07/21 64.1 kg (141 lb 6.4 oz)   08/20/21 61.2 kg (135 lb)                  Patient Active Problem List   Diagnosis     Nasal polyposis     Chronic sinus infection     CARDIOVASCULAR SCREENING; LDL GOAL LESS THAN 160     Generalized anxiety disorder     Past Surgical History:   Procedure Laterality Date     COLONOSCOPY N/A 8/27/2021    Procedure: Colonoscopy, Flexible, With Lesion Removal Using Snare;  Surgeon: Randy Mchugh MD;  Location: [facility] OR     COLONOSCOPY WITH CO2 INSUFFLATION N/A 8/27/2021    Procedure: COLONOSCOPY, WITH CO2 INSUFFLATION;  Surgeon: Randy Mchugh MD;  Location: [facility]     GENITOURINARY SURGERY      c section x 2     HYSTERECTOMY TOTAL ABDOMINAL       HYSTERECTOMY, PAP NO LONGER INDICATED       OPTICAL TRACKING SYSTEM ENDOSCOPIC SINUS SURGERY Bilateral 08/26/2016    Procedure: OPTICAL TRACKING SYSTEM ENDOSCOPIC SINUS SURGERY;  Surgeon: Govind Chester MD;  Location: [facility] OR     SINUS SURGERY      sinus x 4     SURGICAL HISTORY OF -   04/10/2008    SINUS SURGERY     TONSILLECTOMY         Social History     Tobacco Use      Smoking status: Never Smoker     Smokeless tobacco: Never Used   Substance Use Topics     Alcohol use: Yes     Comment: occ. 2 drinks a week     Family History   Problem Relation Age of Onset     Hypertension Mother          Current Outpatient Medications   Medication Sig Dispense Refill     escitalopram (LEXAPRO) 10 MG tablet Take 1 tablet (10 mg) by mouth daily Note decrease in dose 90 tablet 3     propranolol (INDERAL) 20 MG tablet Take 1 tablet (20 mg) by mouth 3 times daily as needed (anxiety) 90 tablet 11     valACYclovir (VALTREX) 500 MG tablet Take 1 tablet (500 mg) by mouth 2 times daily 30 tablet 3     Allergies   Allergen Reactions     Sulfa Drugs Hives       Breast Cancer Screening:    Breast CA Risk Assessment (FHS-7) 3/29/2021   Do you have a family history of breast, colon, or ovarian cancer? No / Unknown         Pertinent mammograms are reviewed under the imaging tab.    Reviewed and updated as needed this visit by clinical staff   Tobacco  Allergies  Meds   Med Hx  Surg Hx  Fam Hx  Soc Hx          Reviewed and updated as needed this visit by Provider                   Past Medical History:   Diagnosis Date     Chronic sinusitis       Past Surgical History:   Procedure Laterality Date     COLONOSCOPY N/A 8/27/2021    Procedure: Colonoscopy, Flexible, With Lesion Removal Using Snare;  Surgeon: Randy Mchugh MD;  Location: [facility]     COLONOSCOPY WITH CO2 INSUFFLATION N/A 8/27/2021    Procedure: COLONOSCOPY, WITH CO2 INSUFFLATION;  Surgeon: Randy Mchugh MD;  Location: [facility]     GENITOURINARY SURGERY      c section x 2     HYSTERECTOMY TOTAL ABDOMINAL       HYSTERECTOMY, PAP NO LONGER INDICATED       OPTICAL TRACKING SYSTEM ENDOSCOPIC SINUS SURGERY Bilateral 08/26/2016    Procedure: OPTICAL TRACKING SYSTEM ENDOSCOPIC SINUS SURGERY;  Surgeon: Govind Chester MD;  Location: [facility]     SINUS SURGERY      sinus x 4     SURGICAL HISTORY OF -   04/10/2008    SINUS  "SURGERY     TONSILLECTOMY         Review of Systems   Constitutional: Negative for chills and fever.   HENT: Negative for congestion, ear pain, hearing loss and sore throat.    Eyes: Negative for pain and visual disturbance.   Respiratory: Negative for cough and shortness of breath.    Cardiovascular: Negative for chest pain, palpitations and peripheral edema.   Gastrointestinal: Negative for abdominal pain, constipation, diarrhea, heartburn, hematochezia and nausea.   Breasts:  Negative for tenderness, breast mass and discharge.   Genitourinary: Negative for dysuria, frequency, genital sores, hematuria, pelvic pain, urgency, vaginal bleeding and vaginal discharge.   Musculoskeletal: Negative for arthralgias, joint swelling and myalgias.   Skin: Negative for rash.   Neurological: Negative for dizziness, weakness, headaches and paresthesias.   Psychiatric/Behavioral: Negative for mood changes. The patient is nervous/anxious.         OBJECTIVE:   /80   Pulse 67   Temp 97.7  F (36.5  C) (Tympanic)   Resp 14   Ht 1.626 m (5' 4\")   Wt 64.4 kg (142 lb)   SpO2 98%   Breastfeeding No   BMI 24.37 kg/m    Physical Exam  GENERAL: healthy, alert and no distress  EYES: Eyes grossly normal to inspection, PERRL and conjunctivae and sclerae normal  HENT: ear canals and TM's normal, nose and mouth without ulcers or lesions  NECK: no adenopathy, no asymmetry, masses, or scars and thyroid normal to palpation  RESP: lungs clear to auscultation - no rales, rhonchi or wheezes  BREAST: normal without masses, tenderness or nipple discharge and no palpable axillary masses or adenopathy  CV: regular rate and rhythm, normal S1 S2, no S3 or S4, no murmur, click or rub, no peripheral edema and peripheral pulses strong  ABDOMEN: soft, nontender, no hepatosplenomegaly, no masses and bowel sounds normal  MS: no gross musculoskeletal defects noted, no edema  SKIN: no suspicious lesions or rashes. 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 Bili, TP)            Billin additional min spent on patient today including chart review, history, exam, and explaining treatment plan and follow-up.     Patient Instructions   Please return fasting for cholesterol and diabetes screening, you can make a lab only appointment for this.  No food or drink other than water for 10 hours.        Preventive Health Recommendations  Female Ages 50 - 64    Yearly exam: See your health care provider every year in order to  o Review health changes.   o Discuss preventive care.    o Review your medicines if your doctor has prescribed any.      Get a Pap test every three years (unless you have an abnormal result and your provider advises testing more often).    If you get Pap tests with HPV test, you only need to test every 5 years, unless you have an abnormal result.     You do not need a Pap test if your uterus was removed (hysterectomy) and you have not had cancer.    You should be tested each year  "for STDs (sexually transmitted diseases) if you're at risk.     Have a mammogram every 1 to 2 years.    Have a colonoscopy at age 50, or have a yearly FIT test (stool test). These exams screen for colon cancer.      Have a cholesterol test every 5 years, or more often if advised.    Have a diabetes test (fasting glucose) every three years. If you are at risk for diabetes, you should have this test more often.     If you are at risk for osteoporosis (brittle bone disease), think about having a bone density scan (DEXA).    Shots: Get a flu shot each year. Get a tetanus shot every 10 years.    Nutrition:     Eat at least 5 servings of fruits and vegetables each day.    Eat whole-grain bread, whole-wheat pasta and brown rice instead of white grains and rice.    Get adequate Calcium and Vitamin D.     Lifestyle    Exercise at least 150 minutes a week (30 minutes a day, 5 days a week). This will help you control your weight and prevent disease.    Limit alcohol to one drink per day.    No smoking.     Wear sunscreen to prevent skin cancer.     See your dentist every six months for an exam and cleaning.    See your eye doctor every 1 to 2 years.          Patient has been advised of split billing requirements and indicates understanding: Yes    COUNSELING:  Reviewed preventive health counseling, as reflected in patient instructions       Regular exercise    Estimated body mass index is 24.37 kg/m  as calculated from the following:    Height as of this encounter: 1.626 m (5' 4\").    Weight as of this encounter: 64.4 kg (142 lb).        She reports that she has never smoked. She has never used smokeless tobacco.      Counseling Resources:  ATP IV Guidelines  Pooled Cohorts Equation Calculator  Breast Cancer Risk Calculator  BRCA-Related Cancer Risk Assessment: FHS-7 Tool  FRAX Risk Assessment  ICSI Preventive Guidelines  Dietary Guidelines for Americans, 2010  USDA's MyPlate  ASA Prophylaxis  Lung CA Screening    Ilana " Jessika Ball PA-C  [facility]

## 2022-06-13 NOTE — PATIENT INSTRUCTIONS
Please return fasting for cholesterol and diabetes screening, you can make a lab only appointment for this.  No food or drink other than water for 10 hours.        Preventive Health Recommendations  Female Ages 50 - 64    Yearly exam: See your health care provider every year in order to  Review health changes.   Discuss preventive care.    Review your medicines if your doctor has prescribed any.    Get a Pap test every three years (unless you have an abnormal result and your provider advises testing more often).  If you get Pap tests with HPV test, you only need to test every 5 years, unless you have an abnormal result.   You do not need a Pap test if your uterus was removed (hysterectomy) and you have not had cancer.  You should be tested each year for STDs (sexually transmitted diseases) if you're at risk.   Have a mammogram every 1 to 2 years.  Have a colonoscopy at age 50, or have a yearly FIT test (stool test). These exams screen for colon cancer.    Have a cholesterol test every 5 years, or more often if advised.  Have a diabetes test (fasting glucose) every three years. If you are at risk for diabetes, you should have this test more often.   If you are at risk for osteoporosis (brittle bone disease), think about having a bone density scan (DEXA).    Shots: Get a flu shot each year. Get a tetanus shot every 10 years.    Nutrition:   Eat at least 5 servings of fruits and vegetables each day.  Eat whole-grain bread, whole-wheat pasta and brown rice instead of white grains and rice.  Get adequate Calcium and Vitamin D.     Lifestyle  Exercise at least 150 minutes a week (30 minutes a day, 5 days a week). This will help you control your weight and prevent disease.  Limit alcohol to one drink per day.  No smoking.   Wear sunscreen to prevent skin cancer.   See your dentist every six months for an exam and cleaning.  See your eye doctor every 1 to 2 years.

## 2022-06-14 ASSESSMENT — ENCOUNTER SYMPTOMS
DYSURIA: 0
ABDOMINAL PAIN: 0
COUGH: 0
FEVER: 0
NAUSEA: 0
BREAST MASS: 0
DIARRHEA: 0
FREQUENCY: 0
PALPITATIONS: 0
CONSTIPATION: 0
SHORTNESS OF BREATH: 0
JOINT SWELLING: 0
HEMATURIA: 0
PARESTHESIAS: 0
MYALGIAS: 0
SORE THROAT: 0
ARTHRALGIAS: 0
CHILLS: 0
WEAKNESS: 0
EYE PAIN: 0
HEADACHES: 0
HEMATOCHEZIA: 0
NERVOUS/ANXIOUS: 1
HEARTBURN: 0
DIZZINESS: 0

## 2022-06-14 ASSESSMENT — PATIENT HEALTH QUESTIONNAIRE - PHQ9
SUM OF ALL RESPONSES TO PHQ QUESTIONS 1-9: 3
SUM OF ALL RESPONSES TO PHQ QUESTIONS 1-9: 3
10. IF YOU CHECKED OFF ANY PROBLEMS, HOW DIFFICULT HAVE THESE PROBLEMS MADE IT FOR YOU TO DO YOUR WORK, TAKE CARE OF THINGS AT HOME, OR GET ALONG WITH OTHER PEOPLE: NOT DIFFICULT AT ALL

## 2022-06-21 ENCOUNTER — OFFICE VISIT (OUTPATIENT)
Dept: FAMILY MEDICINE | Facility: CLINIC | Age: 52
End: 2022-06-21
Payer: COMMERCIAL

## 2022-06-21 VITALS
HEIGHT: 64 IN | OXYGEN SATURATION: 98 % | SYSTOLIC BLOOD PRESSURE: 136 MMHG | RESPIRATION RATE: 14 BRPM | WEIGHT: 142 LBS | DIASTOLIC BLOOD PRESSURE: 80 MMHG | BODY MASS INDEX: 24.24 KG/M2 | HEART RATE: 67 BPM | TEMPERATURE: 97.7 F

## 2022-06-21 DIAGNOSIS — Z13.6 CARDIOVASCULAR SCREENING; LDL GOAL LESS THAN 160: ICD-10-CM

## 2022-06-21 DIAGNOSIS — B00.1 RECURRENT COLD SORES: ICD-10-CM

## 2022-06-21 DIAGNOSIS — F41.1 GENERALIZED ANXIETY DISORDER: ICD-10-CM

## 2022-06-21 DIAGNOSIS — Z13.1 SCREENING FOR DIABETES MELLITUS: ICD-10-CM

## 2022-06-21 DIAGNOSIS — L60.0 INGROWN TOENAIL OF LEFT FOOT: ICD-10-CM

## 2022-06-21 DIAGNOSIS — Z00.00 ROUTINE GENERAL MEDICAL EXAMINATION AT A HEALTH CARE FACILITY: Primary | ICD-10-CM

## 2022-06-21 PROCEDURE — 99214 OFFICE O/P EST MOD 30 MIN: CPT | Mod: 25 | Performed by: PHYSICIAN ASSISTANT

## 2022-06-21 PROCEDURE — 99396 PREV VISIT EST AGE 40-64: CPT | Performed by: PHYSICIAN ASSISTANT

## 2022-06-21 RX ORDER — VALACYCLOVIR HYDROCHLORIDE 500 MG/1
500 TABLET, FILM COATED ORAL 2 TIMES DAILY
Qty: 30 TABLET | Refills: 3 | Status: SHIPPED | OUTPATIENT
Start: 2022-06-21

## 2022-06-21 RX ORDER — ESCITALOPRAM OXALATE 10 MG/1
10 TABLET ORAL DAILY
Qty: 90 TABLET | Refills: 3 | Status: SHIPPED | OUTPATIENT
Start: 2022-06-21 | End: 2023-07-10

## 2022-06-21 RX ORDER — PROPRANOLOL HYDROCHLORIDE 20 MG/1
20 TABLET ORAL 3 TIMES DAILY PRN
Qty: 90 TABLET | Refills: 11 | Status: SHIPPED | OUTPATIENT
Start: 2022-06-21 | End: 2024-01-02

## 2022-06-21 ASSESSMENT — ENCOUNTER SYMPTOMS
PARESTHESIAS: 0
NERVOUS/ANXIOUS: 1
MYALGIAS: 0
HEMATURIA: 0
BREAST MASS: 0
HEARTBURN: 0
JOINT SWELLING: 0
HEADACHES: 0
CONSTIPATION: 0
DIZZINESS: 0
NAUSEA: 0
ARTHRALGIAS: 0
FREQUENCY: 0
ABDOMINAL PAIN: 0
PALPITATIONS: 0
SHORTNESS OF BREATH: 0
SORE THROAT: 0
CHILLS: 0
HEMATOCHEZIA: 0
FEVER: 0
COUGH: 0
WEAKNESS: 0
DYSURIA: 0
DIARRHEA: 0
EYE PAIN: 0

## 2022-09-30 ENCOUNTER — ANCILLARY PROCEDURE (OUTPATIENT)
Dept: MAMMOGRAPHY | Facility: CLINIC | Age: 52
End: 2022-09-30
Attending: PHYSICIAN ASSISTANT
Payer: COMMERCIAL

## 2022-09-30 DIAGNOSIS — Z12.31 VISIT FOR SCREENING MAMMOGRAM: ICD-10-CM

## 2022-09-30 PROCEDURE — 77067 SCR MAMMO BI INCL CAD: CPT | Mod: TC | Performed by: RADIOLOGY

## 2022-09-30 PROCEDURE — 77063 BREAST TOMOSYNTHESIS BI: CPT | Mod: TC | Performed by: RADIOLOGY

## 2022-10-10 ENCOUNTER — APPOINTMENT (OUTPATIENT)
Dept: URBAN - METROPOLITAN AREA CLINIC 252 | Age: 52
Setting detail: DERMATOLOGY
End: 2022-10-14

## 2022-10-10 DIAGNOSIS — Z41.9 ENCOUNTER FOR PROCEDURE FOR PURPOSES OTHER THAN REMEDYING HEALTH STATE, UNSPECIFIED: ICD-10-CM

## 2022-10-10 PROCEDURE — OTHER BOTOX (U OR CC): OTHER

## 2022-10-10 NOTE — PROCEDURE: BOTOX (U OR CC)
Price (Use Numbers Only, No Special Characters Or $): 086 Price (Use Numbers Only, No Special Characters Or $): 053

## 2023-01-18 ENCOUNTER — APPOINTMENT (OUTPATIENT)
Dept: URBAN - METROPOLITAN AREA CLINIC 252 | Age: 53
Setting detail: DERMATOLOGY
End: 2023-01-18

## 2023-01-18 DIAGNOSIS — Z41.9 ENCOUNTER FOR PROCEDURE FOR PURPOSES OTHER THAN REMEDYING HEALTH STATE, UNSPECIFIED: ICD-10-CM

## 2023-01-18 PROCEDURE — OTHER BOTOX (U OR CC): OTHER

## 2023-01-18 NOTE — PROCEDURE: BOTOX (U OR CC)
Price (Use Numbers Only, No Special Characters Or $): 383 Price (Use Numbers Only, No Special Characters Or $): 646

## 2023-04-13 ENCOUNTER — TELEPHONE (OUTPATIENT)
Dept: FAMILY MEDICINE | Facility: CLINIC | Age: 53
End: 2023-04-13
Payer: COMMERCIAL

## 2023-04-13 NOTE — TELEPHONE ENCOUNTER
Pt is requesting Azithromycin 250 MG be sent to [id number] - [place], [place] - [address] [facility]     I'm not sure how she got routed to me, and I did not see the medication in med management. Please advise.    Colin MENDEZ [facility]    Primary Care

## 2023-04-18 ENCOUNTER — APPOINTMENT (OUTPATIENT)
Dept: URBAN - METROPOLITAN AREA CLINIC 252 | Age: 53
Setting detail: DERMATOLOGY
End: 2023-04-23

## 2023-04-18 DIAGNOSIS — Z41.9 ENCOUNTER FOR PROCEDURE FOR PURPOSES OTHER THAN REMEDYING HEALTH STATE, UNSPECIFIED: ICD-10-CM

## 2023-04-18 PROCEDURE — OTHER BOTOX (U OR CC): OTHER

## 2023-04-18 NOTE — PROCEDURE: BOTOX (U OR CC)
Price (Use Numbers Only, No Special Characters Or $): 421 Price (Use Numbers Only, No Special Characters Or $): 383

## 2023-05-22 ENCOUNTER — PATIENT OUTREACH (OUTPATIENT)
Dept: CARE COORDINATION | Facility: CLINIC | Age: 53
End: 2023-05-22
Payer: COMMERCIAL

## 2023-06-06 ENCOUNTER — PATIENT OUTREACH (OUTPATIENT)
Dept: CARE COORDINATION | Facility: CLINIC | Age: 53
End: 2023-06-06
Payer: COMMERCIAL

## 2023-07-11 ENCOUNTER — MYC REFILL (OUTPATIENT)
Dept: FAMILY MEDICINE | Facility: CLINIC | Age: 53
End: 2023-07-11
Payer: COMMERCIAL

## 2023-07-11 DIAGNOSIS — F41.1 GENERALIZED ANXIETY DISORDER: ICD-10-CM

## 2023-07-11 RX ORDER — ESCITALOPRAM OXALATE 10 MG/1
10 TABLET ORAL DAILY
Qty: 30 TABLET | Refills: 0 | OUTPATIENT
Start: 2023-07-11

## 2023-07-19 ENCOUNTER — APPOINTMENT (OUTPATIENT)
Dept: URBAN - METROPOLITAN AREA CLINIC 252 | Age: 53
Setting detail: DERMATOLOGY
End: 2023-07-19

## 2023-07-19 DIAGNOSIS — Z41.9 ENCOUNTER FOR PROCEDURE FOR PURPOSES OTHER THAN REMEDYING HEALTH STATE, UNSPECIFIED: ICD-10-CM

## 2023-07-19 PROCEDURE — OTHER BOTOX: OTHER

## 2023-07-19 NOTE — PROCEDURE: BOTOX
Price (Use Numbers Only, No Special Characters Or $): 347 Price (Use Numbers Only, No Special Characters Or $): 122

## 2023-08-21 ASSESSMENT — ENCOUNTER SYMPTOMS
BREAST MASS: 1
DYSURIA: 0
ARTHRALGIAS: 0
ABDOMINAL PAIN: 0
EYE PAIN: 0
SHORTNESS OF BREATH: 0
NERVOUS/ANXIOUS: 1
FREQUENCY: 0
WEAKNESS: 0
HEARTBURN: 0
HEMATOCHEZIA: 0
FEVER: 0
JOINT SWELLING: 0
HEMATURIA: 0
MYALGIAS: 0
DIZZINESS: 0
SORE THROAT: 0
DIARRHEA: 0
COUGH: 0
HEADACHES: 0
PALPITATIONS: 0
PARESTHESIAS: 0
CONSTIPATION: 0
NAUSEA: 0
CHILLS: 0

## 2023-08-24 ASSESSMENT — PATIENT HEALTH QUESTIONNAIRE - PHQ9
SUM OF ALL RESPONSES TO PHQ QUESTIONS 1-9: 4
10. IF YOU CHECKED OFF ANY PROBLEMS, HOW DIFFICULT HAVE THESE PROBLEMS MADE IT FOR YOU TO DO YOUR WORK, TAKE CARE OF THINGS AT HOME, OR GET ALONG WITH OTHER PEOPLE: SOMEWHAT DIFFICULT
SUM OF ALL RESPONSES TO PHQ QUESTIONS 1-9: 4

## 2023-08-25 ENCOUNTER — OFFICE VISIT (OUTPATIENT)
Dept: FAMILY MEDICINE | Facility: CLINIC | Age: 53
End: 2023-08-25
Payer: COMMERCIAL

## 2023-08-25 VITALS
HEART RATE: 78 BPM | TEMPERATURE: 97.6 F | DIASTOLIC BLOOD PRESSURE: 70 MMHG | RESPIRATION RATE: 16 BRPM | BODY MASS INDEX: 25 KG/M2 | HEIGHT: 64 IN | WEIGHT: 146.4 LBS | SYSTOLIC BLOOD PRESSURE: 122 MMHG | OXYGEN SATURATION: 100 %

## 2023-08-25 DIAGNOSIS — Z12.31 ENCOUNTER FOR SCREENING MAMMOGRAM FOR BREAST CANCER: ICD-10-CM

## 2023-08-25 DIAGNOSIS — Z00.00 ROUTINE GENERAL MEDICAL EXAMINATION AT A HEALTH CARE FACILITY: Primary | ICD-10-CM

## 2023-08-25 DIAGNOSIS — F41.1 GENERALIZED ANXIETY DISORDER: ICD-10-CM

## 2023-08-25 DIAGNOSIS — Z13.220 SCREENING FOR HYPERLIPIDEMIA: ICD-10-CM

## 2023-08-25 DIAGNOSIS — L98.9 SKIN LESION: ICD-10-CM

## 2023-08-25 PROCEDURE — 99214 OFFICE O/P EST MOD 30 MIN: CPT | Mod: 25 | Performed by: STUDENT IN AN ORGANIZED HEALTH CARE EDUCATION/TRAINING PROGRAM

## 2023-08-25 PROCEDURE — 99396 PREV VISIT EST AGE 40-64: CPT | Performed by: STUDENT IN AN ORGANIZED HEALTH CARE EDUCATION/TRAINING PROGRAM

## 2023-08-25 RX ORDER — ESCITALOPRAM OXALATE 10 MG/1
10 TABLET ORAL DAILY
Qty: 90 TABLET | Refills: 3 | Status: SHIPPED | OUTPATIENT
Start: 2023-08-25 | End: 2024-09-13

## 2023-08-25 ASSESSMENT — ENCOUNTER SYMPTOMS
DIZZINESS: 0
NERVOUS/ANXIOUS: 1
PALPITATIONS: 0
SORE THROAT: 0
EYE PAIN: 0
FREQUENCY: 0
NAUSEA: 0
WEAKNESS: 0
HEARTBURN: 0
JOINT SWELLING: 0
HEMATOCHEZIA: 0
PARESTHESIAS: 0
CONSTIPATION: 0
HEMATURIA: 0
ARTHRALGIAS: 0
SHORTNESS OF BREATH: 0
ABDOMINAL PAIN: 0
FEVER: 0
DIARRHEA: 0
HEADACHES: 0
MYALGIAS: 0
COUGH: 0
DYSURIA: 0
CHILLS: 0
BREAST MASS: 1

## 2023-08-25 NOTE — PROGRESS NOTES
SUBJECTIVE:   CC: BRIAN SOMERS is an 53 year old who presents for preventive health visit.       8/25/2023     2:24 PM   Additional Questions   Roomed by Say       Healthy Habits:     Getting at least 3 servings of Calcium per day:  Yes    Bi-annual eye exam:  Yes    Dental care twice a year:  Yes    Sleep apnea or symptoms of sleep apnea:  None    Diet:  Regular (no restrictions)    Frequency of exercise:  4-5 days/week    Duration of exercise:  15-30 minutes    Taking medications regularly:  Yes    Medication side effects:  None    Additional concerns today:  Yes    Patient is on lexapro for her anxiety and depression. She feels like she is getting more anxious. She has noticed 2 new red spot on the skin of her left breast. There is no lump or bumps or breast discharge. The spot has not changed in color or size.  Today's PHQ-9 Score:       8/24/2023     4:51 PM   PHQ-9 SCORE   PHQ-9 Total Score MyChart 4 (Minimal depression)   PHQ-9 Total Score 4               Have you ever done Advance Care Planning? (For example, a Health Directive, POLST, or a discussion with a medical provider or your loved ones about your wishes): No, advance care planning information given to patient to review.  Patient plans to discuss their wishes with loved ones or provider.      Social History     Tobacco Use    Smoking status: Never    Smokeless tobacco: Never   Substance Use Topics    Alcohol use: Yes     Comment: occ. 2 drinks a week             8/21/2023    12:22 PM   Alcohol Use   Prescreen: >3 drinks/day or >7 drinks/week? No       Reviewed orders with patient.  Reviewed health maintenance and updated orders accordingly - Yes  Lab work is in process  Labs reviewed in EPIC    Breast Cancer Screening:        3/29/2021    10:51 AM   Breast CA Risk Assessment (FHS-7)   Do you have a family history of breast, colon, or ovarian cancer? No / Unknown       click delete button to remove this line now  Mammogram Screening:  Recommended annual mammography  Pertinent mammograms are reviewed under the imaging tab.    History of abnormal Pap smear:    Reviewed and updated as needed this visit by clinical staff   Tobacco  Allergies               Reviewed and updated as needed this visit by Provider                 Past Medical History:   Diagnosis Date    Chronic sinusitis       Past Surgical History:   Procedure Laterality Date    COLONOSCOPY N/A 8/27/2021    Procedure: Colonoscopy, Flexible, With Lesion Removal Using Snare;  Surgeon: Randy Mchugh MD;  Location: [facility] OR    COLONOSCOPY WITH CO2 INSUFFLATION N/A 8/27/2021    Procedure: COLONOSCOPY, WITH CO2 INSUFFLATION;  Surgeon: Randy Mchugh MD;  Location: [facility] OR    GENITOURINARY SURGERY      c section x 2    HYSTERECTOMY TOTAL ABDOMINAL      HYSTERECTOMY, PAP NO LONGER INDICATED      OPTICAL TRACKING SYSTEM ENDOSCOPIC SINUS SURGERY Bilateral 08/26/2016    Procedure: OPTICAL TRACKING SYSTEM ENDOSCOPIC SINUS SURGERY;  Surgeon: Govind Chester MD;  Location: [facility] OR    SINUS SURGERY      sinus x 4    SURGICAL HISTORY OF -   04/10/2008    SINUS SURGERY    TONSILLECTOMY       OB History   Obstetric Comments   2        Review of Systems   Constitutional:  Negative for chills and fever.   HENT:  Negative for congestion, ear pain, hearing loss and sore throat.    Eyes:  Positive for visual disturbance. Negative for pain.   Respiratory:  Negative for cough and shortness of breath.    Cardiovascular:  Negative for chest pain, palpitations and peripheral edema.   Gastrointestinal:  Negative for abdominal pain, constipation, diarrhea, heartburn, hematochezia and nausea.   Breasts:  Positive for tenderness and breast mass. Negative for discharge.   Genitourinary:  Negative for dysuria, frequency, genital sores, hematuria, pelvic pain, urgency, vaginal bleeding and vaginal discharge.   Musculoskeletal:  Negative for arthralgias, joint swelling and myalgias.   Skin:   "Negative for rash.   Neurological:  Negative for dizziness, weakness, headaches and paresthesias.   Psychiatric/Behavioral:  Negative for mood changes. The patient is nervous/anxious.           OBJECTIVE:   /70   Pulse 78   Temp 97.6  F (36.4  C) (Temporal)   Resp 16   Ht 1.626 m (5' 4\")   Wt 66.4 kg (146 lb 6.4 oz)   SpO2 100%   BMI 25.13 kg/m    Physical Exam  GENERAL: healthy, alert and no distress  Breast: no lumps , no drainage. There are 2 red symmetrical spots on the skin of the left breast  EYES: Eyes grossly normal to inspection, PERRL and conjunctivae and sclerae normal  HENT: ear canals and TM's normal, nose and mouth without ulcers or lesions  NECK: thyroid normal to palpation  RESP: lungs clear to auscultation - no rales, rhonchi or wheezes  CV: regular rate and rhythm, normal S1 S2, no S3 or S4, no murmur, click or rub, no peripheral edema and peripheral pulses strong  ABDOMEN: soft, nontender, no hepatosplenomegaly, no masses and bowel sounds normal  MS: no gross musculoskeletal defects noted,  SKIN: no suspicious lesions or rashes  NEURO: Normal strength and tone, mentation intact and speech normal  PSYCH: mentation appears normal, affect normal/bright        ASSESSMENT/PLAN:   1. Routine general medical examination at a health care facility    - Comprehensive metabolic panel (BMP + Alb, Alk Phos, ALT, AST, Total. Bili, TP); Future  - Adult Dermatology Referral; Future for skin check    2. Generalized anxiety disorder    - escitalopram (LEXAPRO) 10 MG tablet; Take 1 tablet (10 mg) by mouth daily  Dispense: 90 tablet; Refill: 3  - Adult Mental Health  Referral; Future    3. Encounter for screening mammogram for breast cancer    - MA Screen Bilateral w/Syed; Future    4. Screening for hyperlipidemia    - Lipid panel reflex to direct LDL Fasting; Future  5 Skin lesion  - Adult Dermatology Referral; Future for skin check  Patient has been advised of split billing requirements and " indicates understanding: Yes      COUNSELING:  Reviewed preventive health counseling, as reflected in patient instructions        She reports that she has never smoked. She has never used smokeless tobacco.          Rebeca Rodriguez MD  [facility] submitted by the patient for this visit:  Patient Health Questionnaire (Submitted on 8/24/2023)  If you checked off any problems, how difficult have these problems made it for you to do your work, take care of things at home, or get along with other people?: Somewhat difficult  PHQ9 TOTAL SCORE: 4

## 2023-08-30 ENCOUNTER — MYC MEDICAL ADVICE (OUTPATIENT)
Dept: FAMILY MEDICINE | Facility: CLINIC | Age: 53
End: 2023-08-30
Payer: COMMERCIAL

## 2023-08-31 ENCOUNTER — PATIENT OUTREACH (OUTPATIENT)
Dept: CARE COORDINATION | Facility: CLINIC | Age: 53
End: 2023-08-31
Payer: COMMERCIAL

## 2023-09-06 ENCOUNTER — ALLIED HEALTH/NURSE VISIT (OUTPATIENT)
Dept: FAMILY MEDICINE | Facility: CLINIC | Age: 53
End: 2023-09-06
Payer: COMMERCIAL

## 2023-09-06 DIAGNOSIS — Z23 ENCOUNTER FOR IMMUNIZATION: Primary | ICD-10-CM

## 2023-09-06 PROCEDURE — 90471 IMMUNIZATION ADMIN: CPT

## 2023-09-06 PROCEDURE — 90750 HZV VACC RECOMBINANT IM: CPT

## 2023-09-06 PROCEDURE — 99207 PR NO CHARGE NURSE ONLY: CPT

## 2023-09-06 NOTE — PROGRESS NOTES
Prior to immunization administration, verified patients identity using patient s name and date of birth. Please see Immunization Activity for additional information.     Screening Questionnaire for Adult Immunization    Are you sick today?   No   Do you have allergies to medications, food, a vaccine component or latex?   No   Have you ever had a serious reaction after receiving a vaccination?   No   Do you have a long-term health problem with heart, lung, kidney, or metabolic disease (e.g., diabetes), asthma, a blood disorder, no spleen, complement component deficiency, a cochlear implant, or a spinal fluid leak?  Are you on long-term aspirin therapy?   No   Do you have cancer, leukemia, HIV/AIDS, or any other immune system problem?   No   Do you have a parent, brother, or sister with an immune system problem?   No   In the past 3 months, have you taken medications that affect  your immune system, such as prednisone, other steroids, or anticancer drugs; drugs for the treatment of rheumatoid arthritis, Crohn s disease, or psoriasis; or have you had radiation treatments?   No   Have you had a seizure, or a brain or other nervous system problem?   No   During the past year, have you received a transfusion of blood or blood    products, or been given immune (gamma) globulin or antiviral drug?   No   For women: Are you pregnant or is there a chance you could become       pregnant during the next month?   No   Have you received any vaccinations in the past 4 weeks?   No     Immunization questionnaire answers were all negative.    I have reviewed the following standing orders:   This patient is due and qualifies for the Zoster vaccine.    Click here for Zoster Standing Order    I have reviewed the vaccines inclusion and exclusion criteria; No concerns regarding eligibility.     Patient instructed to remain in clinic for 15 minutes afterwards, and to report any adverse reactions.     Screening performed by Mitchel Lawson MA  on 9/6/2023 at 3:40 PM.

## 2023-09-06 NOTE — PROGRESS NOTES
"Prior to immunization administration, verified patients identity using patient s name and date of birth. Please see Immunization Activity for additional information.     Screening Questionnaire for Adult Immunization    Are you sick today?   {:114089}   Do you have allergies to medications, food, a vaccine component or latex?   {:447552}   Have you ever had a serious reaction after receiving a vaccination?   {:208478}   Do you have a long-term health problem with heart, lung, kidney, or metabolic disease (e.g., diabetes), asthma, a blood disorder, no spleen, complement component deficiency, a cochlear implant, or a spinal fluid leak?  Are you on long-term aspirin therapy?   {:082839}   Do you have cancer, leukemia, HIV/AIDS, or any other immune system problem?   {:362765}   Do you have a parent, brother, or sister with an immune system problem?   {:705856}   In the past 3 months, have you taken medications that affect  your immune system, such as prednisone, other steroids, or anticancer drugs; drugs for the treatment of rheumatoid arthritis, Crohn s disease, or psoriasis; or have you had radiation treatments?   {:943036}   Have you had a seizure, or a brain or other nervous system problem?   {:497048}   During the past year, have you received a transfusion of blood or blood    products, or been given immune (gamma) globulin or antiviral drug?   {:759362}   For women: Are you pregnant or is there a chance you could become       pregnant during the next month?   {:199050}   Have you received any vaccinations in the past 4 weeks?   {:521784}     Immunization questionnaire { :858414::\"answers were all negative.\"}    I have reviewed the following standing orders:   This patient is due and qualifies for the Zoster vaccine.    Click here for Zoster Standing Order    I have reviewed the vaccines inclusion and exclusion criteria; { :434097}    Patient instructed to remain in clinic for 15 minutes afterwards, and to report " any adverse reactions.     Screening performed by Mitchel RON LPN on 9/6/2023 at 3:10 PM.

## 2023-09-14 ENCOUNTER — LAB (OUTPATIENT)
Dept: LAB | Facility: CLINIC | Age: 53
End: 2023-09-14
Payer: COMMERCIAL

## 2023-09-14 DIAGNOSIS — Z00.00 ROUTINE GENERAL MEDICAL EXAMINATION AT A HEALTH CARE FACILITY: ICD-10-CM

## 2023-09-14 DIAGNOSIS — Z13.220 SCREENING FOR HYPERLIPIDEMIA: ICD-10-CM

## 2023-09-14 LAB
ALBUMIN SERPL BCG-MCNC: 4.9 G/DL (ref 3.5–5.2)
ALP SERPL-CCNC: 66 U/L (ref 35–104)
ALT SERPL W P-5'-P-CCNC: 23 U/L (ref 0–50)
ANION GAP SERPL CALCULATED.3IONS-SCNC: 14 MMOL/L (ref 7–15)
AST SERPL W P-5'-P-CCNC: 26 U/L (ref 0–45)
BILIRUB SERPL-MCNC: 0.5 MG/DL
BUN SERPL-MCNC: 15.7 MG/DL (ref 6–20)
CALCIUM SERPL-MCNC: 10 MG/DL (ref 8.6–10)
CHLORIDE SERPL-SCNC: 102 MMOL/L (ref 98–107)
CHOLEST SERPL-MCNC: 281 MG/DL
CREAT SERPL-MCNC: 0.77 MG/DL (ref 0.51–0.95)
DEPRECATED HCO3 PLAS-SCNC: 25 MMOL/L (ref 22–29)
EGFRCR SERPLBLD CKD-EPI 2021: >90 ML/MIN/1.73M2
GLUCOSE SERPL-MCNC: 94 MG/DL (ref 70–99)
HDLC SERPL-MCNC: 62 MG/DL
LDLC SERPL CALC-MCNC: 171 MG/DL
NONHDLC SERPL-MCNC: 219 MG/DL
POTASSIUM SERPL-SCNC: 4.8 MMOL/L (ref 3.4–5.3)
PROT SERPL-MCNC: 7.5 G/DL (ref 6.4–8.3)
SODIUM SERPL-SCNC: 141 MMOL/L (ref 136–145)
TRIGL SERPL-MCNC: 241 MG/DL

## 2023-09-14 PROCEDURE — 36415 COLL VENOUS BLD VENIPUNCTURE: CPT

## 2023-09-14 PROCEDURE — 80053 COMPREHEN METABOLIC PANEL: CPT

## 2023-09-14 PROCEDURE — 80061 LIPID PANEL: CPT

## 2023-09-28 ENCOUNTER — PATIENT OUTREACH (OUTPATIENT)
Dept: CARE COORDINATION | Facility: CLINIC | Age: 53
End: 2023-09-28
Payer: COMMERCIAL

## 2023-10-30 ENCOUNTER — ANCILLARY PROCEDURE (OUTPATIENT)
Dept: MAMMOGRAPHY | Facility: CLINIC | Age: 53
End: 2023-10-30
Attending: STUDENT IN AN ORGANIZED HEALTH CARE EDUCATION/TRAINING PROGRAM
Payer: COMMERCIAL

## 2023-10-30 DIAGNOSIS — Z12.31 ENCOUNTER FOR SCREENING MAMMOGRAM FOR BREAST CANCER: ICD-10-CM

## 2023-10-30 PROCEDURE — 77063 BREAST TOMOSYNTHESIS BI: CPT | Mod: TC | Performed by: RADIOLOGY

## 2023-10-30 PROCEDURE — 77067 SCR MAMMO BI INCL CAD: CPT | Mod: TC | Performed by: RADIOLOGY

## 2023-11-08 ENCOUNTER — APPOINTMENT (OUTPATIENT)
Dept: URBAN - METROPOLITAN AREA CLINIC 252 | Age: 53
Setting detail: DERMATOLOGY
End: 2023-11-08

## 2023-11-08 DIAGNOSIS — L28.1 PRURIGO NODULARIS: ICD-10-CM

## 2023-11-08 DIAGNOSIS — Z41.9 ENCOUNTER FOR PROCEDURE FOR PURPOSES OTHER THAN REMEDYING HEALTH STATE, UNSPECIFIED: ICD-10-CM

## 2023-11-08 PROCEDURE — 11900 INJECT SKIN LESIONS </W 7: CPT

## 2023-11-08 PROCEDURE — OTHER BOTOX: OTHER

## 2023-11-08 PROCEDURE — OTHER INTRALESIONAL KENALOG: OTHER

## 2023-11-08 PROCEDURE — OTHER COUNSELING: OTHER

## 2023-11-08 ASSESSMENT — LOCATION SIMPLE DESCRIPTION DERM
LOCATION SIMPLE: LEFT CHEEK
LOCATION SIMPLE: RIGHT CHEEK

## 2023-11-08 ASSESSMENT — LOCATION DETAILED DESCRIPTION DERM
LOCATION DETAILED: LEFT CENTRAL MANDIBULAR CHEEK
LOCATION DETAILED: RIGHT LATERAL MANDIBULAR CHEEK

## 2023-11-08 ASSESSMENT — LOCATION ZONE DERM: LOCATION ZONE: FACE

## 2023-11-08 NOTE — PROCEDURE: COUNSELING
Patient Specific Counseling (Will Not Stick From Patient To Patient): ***added in office visit today \\n\\n-Discussed the diagnosis, etiology and treatment options for prurigo lesions.  \\n-Avoid picking or scratching at lesions, keep covered if necessary to avoid further trauma. \\n-Discussed risks and expectations of Kenalog iL injections, may require more than 1 treatment .
Detail Level: Detailed

## 2023-11-08 NOTE — PROCEDURE: BOTOX
Price (Use Numbers Only, No Special Characters Or $): 680 Price (Use Numbers Only, No Special Characters Or $): 716

## 2023-12-29 ENCOUNTER — MYC MEDICAL ADVICE (OUTPATIENT)
Dept: FAMILY MEDICINE | Facility: CLINIC | Age: 53
End: 2023-12-29
Payer: COMMERCIAL

## 2023-12-29 DIAGNOSIS — F41.1 GENERALIZED ANXIETY DISORDER: ICD-10-CM

## 2024-01-02 RX ORDER — PROPRANOLOL HYDROCHLORIDE 20 MG/1
20 TABLET ORAL 3 TIMES DAILY PRN
Qty: 90 TABLET | Refills: 11 | Status: SHIPPED | OUTPATIENT
Start: 2024-01-02

## 2024-01-03 DIAGNOSIS — F41.1 GENERALIZED ANXIETY DISORDER: ICD-10-CM

## 2024-01-03 RX ORDER — PROPRANOLOL HYDROCHLORIDE 20 MG/1
TABLET ORAL
Qty: 270 TABLET | OUTPATIENT
Start: 2024-01-03

## 2024-02-14 ENCOUNTER — APPOINTMENT (OUTPATIENT)
Dept: URBAN - METROPOLITAN AREA CLINIC 252 | Age: 54
Setting detail: DERMATOLOGY
End: 2024-02-15

## 2024-02-14 DIAGNOSIS — Z41.9 ENCOUNTER FOR PROCEDURE FOR PURPOSES OTHER THAN REMEDYING HEALTH STATE, UNSPECIFIED: ICD-10-CM

## 2024-02-14 PROCEDURE — OTHER BOTOX: OTHER

## 2024-02-14 NOTE — PROCEDURE: BOTOX
Detail Level: Detailed
Expiration Date (Month Year): 2025/09
Consent: COLOR KEY FOR DIAGRAM BELOW:\\nRed = 1/2 unit of Jeuveau per injection\\nOrange = 1 units of Jeuveau per injection\\nYellow = 2 units of Jeuveau per injection\\nGreen = 3 units of Jeuveau per injection\\nBlue = 4 units of Jeuveau per injection\\nBrown = 5 units of Jeuveau per injection\\n\\nWritten consent obtained. Risks include but not limited to lid/brow ptosis, bruising, swelling, diplopia, temporary effect, incomplete chemical denervation.
Post-Care Instructions: Patient instructed to not lie down for 4 hours and limit physical activity for 24 hours.
Map Statement: Please see attached map for locations and injection amounts.
Show Inventory Tab: Show
Price (Use Numbers Only, No Special Characters Or $): 692
Total Units: 36
Lot #: [id number]

## 2024-05-30 ENCOUNTER — APPOINTMENT (OUTPATIENT)
Dept: URBAN - METROPOLITAN AREA CLINIC 252 | Age: 54
Setting detail: DERMATOLOGY
End: 2024-05-30

## 2024-05-30 DIAGNOSIS — Z41.9 ENCOUNTER FOR PROCEDURE FOR PURPOSES OTHER THAN REMEDYING HEALTH STATE, UNSPECIFIED: ICD-10-CM

## 2024-05-30 PROCEDURE — OTHER BOTOX: OTHER

## 2024-05-30 NOTE — PROCEDURE: BOTOX
Consent: COLOR KEY FOR DIAGRAM BELOW:\\nRed = 1/2 unit of Jeuveau per injection\\nOrange = 1 units of Jeuveau per injection\\nYellow = 2 units of Jeuveau per injection\\nGreen = 3 units of Jeuveau per injection\\nBlue = 4 units of Jeuveau per injection\\nBrown = 5 units of Jeuveau per injection\\n\\nWritten consent obtained. Risks include but not limited to lid/brow ptosis, bruising, swelling, diplopia, temporary effect, incomplete chemical denervation.
Expiration Date (Month Year): 2025/09
Total Units: 34
Price (Use Numbers Only, No Special Characters Or $): 315
Detail Level: Detailed
Post-Care Instructions: Patient instructed to not lie down for 4 hours and limit physical activity for 24 hours.
Show Inventory Tab: Show
Lot #: [id number]
Map Statement: Please see attached map for locations and injection amounts.

## 2024-06-29 ENCOUNTER — E-VISIT (OUTPATIENT)
Dept: URGENT CARE | Facility: CLINIC | Age: 54
End: 2024-06-29
Payer: COMMERCIAL

## 2024-06-29 DIAGNOSIS — L24.7 IRRITANT CONTACT DERMATITIS DUE TO PLANTS, EXCEPT FOOD: Primary | ICD-10-CM

## 2024-06-29 PROCEDURE — 99421 OL DIG E/M SVC 5-10 MIN: CPT | Performed by: NURSE PRACTITIONER

## 2024-06-29 RX ORDER — TRIAMCINOLONE ACETONIDE 0.25 MG/G
OINTMENT TOPICAL 2 TIMES DAILY
Qty: 80 G | Refills: 1 | Status: SHIPPED | OUTPATIENT
Start: 2024-06-29

## 2024-06-29 RX ORDER — PREDNISONE 20 MG/1
TABLET ORAL
Qty: 17 TABLET | Refills: 0 | Status: SHIPPED | OUTPATIENT
Start: 2024-06-29 | End: 2024-07-13

## 2024-06-29 NOTE — PATIENT INSTRUCTIONS
Dear Annabelle Greene    After reviewing your responses, I've been able to diagnose you with contact dermatitis, which is a common skin condition that causes small fluid-filled blisters or bumps to appear on your skin.     Based on your responses, I have prescribed a prednisone taper and a steroid ointment to treat this. Please follow the instructions on the medication. If you experience irritation of your skin, new rash, or any other new symptoms, you should stop using this medication and contact your primary care provider.     If this treatment does not work for you or you will run out of refills, please plan to follow- up with your primary care provider to set refills for a longer period of time or to try other options.     Things you can do to help prevent this:     Do not scratch your rash.Bacteria from your fingernails may enter your open sores during scratching and cause an infection.     Use moisturizes or emollients, such as petroleum jelly.These help relieve itching and help prevent bacteria from getting in your sores. If you have a doctor's order for medicated cream, apply that first. Then apply the moisturizer or emollient on top.    Thanks for choosing us as your health care partner,    Pam Chapman, CNP

## 2024-07-26 ENCOUNTER — PATIENT OUTREACH (OUTPATIENT)
Dept: CARE COORDINATION | Facility: CLINIC | Age: 54
End: 2024-07-26
Payer: COMMERCIAL

## 2024-08-09 ENCOUNTER — PATIENT OUTREACH (OUTPATIENT)
Dept: CARE COORDINATION | Facility: CLINIC | Age: 54
End: 2024-08-09
Payer: COMMERCIAL

## 2024-09-04 ENCOUNTER — APPOINTMENT (OUTPATIENT)
Dept: URBAN - METROPOLITAN AREA CLINIC 252 | Age: 54
Setting detail: DERMATOLOGY
End: 2024-09-04

## 2024-09-04 VITALS — WEIGHT: 135 LBS | HEIGHT: 64 IN

## 2024-09-04 DIAGNOSIS — Z41.9 ENCOUNTER FOR PROCEDURE FOR PURPOSES OTHER THAN REMEDYING HEALTH STATE, UNSPECIFIED: ICD-10-CM

## 2024-09-04 PROCEDURE — OTHER BOTOX: OTHER

## 2024-09-04 NOTE — PROCEDURE: BOTOX
Map Statement: Please see attached map for locations and injection amounts.
Lot #: [id number]
Post-Care Instructions: Patient instructed to not lie down for 4 hours and limit physical activity for 24 hours.
Consent: COLOR KEY FOR DIAGRAM BELOW:\\nRed = 1/2 unit of Jeuveau per injection\\nOrange = 1 units of Jeuveau per injection\\nYellow = 2 units of Jeuveau per injection\\nGreen = 3 units of Jeuveau per injection\\nBlue = 4 units of Jeuveau per injection\\nBrown = 5 units of Jeuveau per injection\\n\\nWritten consent obtained. Risks include but not limited to lid/brow ptosis, bruising, swelling, diplopia, temporary effect, incomplete chemical denervation.
Total Units: 33
Expiration Date (Month Year): 2025/09
Detail Level: Detailed
Show Inventory Tab: Show
Price (Use Numbers Only, No Special Characters Or $): 311

## 2024-09-13 ENCOUNTER — MYC REFILL (OUTPATIENT)
Dept: FAMILY MEDICINE | Facility: CLINIC | Age: 54
End: 2024-09-13
Payer: COMMERCIAL

## 2024-09-13 DIAGNOSIS — F41.1 GENERALIZED ANXIETY DISORDER: ICD-10-CM

## 2024-09-13 RX ORDER — ESCITALOPRAM OXALATE 10 MG/1
10 TABLET ORAL DAILY
Qty: 30 TABLET | Refills: 0 | Status: SHIPPED | OUTPATIENT
Start: 2024-09-13 | End: 2024-10-07

## 2024-09-17 DIAGNOSIS — F41.1 GENERALIZED ANXIETY DISORDER: ICD-10-CM

## 2024-09-17 RX ORDER — ESCITALOPRAM OXALATE 10 MG/1
10 TABLET ORAL DAILY
Qty: 30 TABLET | Refills: 0 | OUTPATIENT
Start: 2024-09-17

## 2024-09-30 ENCOUNTER — PATIENT OUTREACH (OUTPATIENT)
Dept: CARE COORDINATION | Facility: CLINIC | Age: 54
End: 2024-09-30
Payer: COMMERCIAL

## 2024-10-07 DIAGNOSIS — F41.1 GENERALIZED ANXIETY DISORDER: ICD-10-CM

## 2024-10-07 RX ORDER — ESCITALOPRAM OXALATE 10 MG/1
10 TABLET ORAL DAILY
Qty: 30 TABLET | Refills: 0 | Status: SHIPPED | OUTPATIENT
Start: 2024-10-07

## 2024-10-11 SDOH — HEALTH STABILITY: PHYSICAL HEALTH: ON AVERAGE, HOW MANY MINUTES DO YOU ENGAGE IN EXERCISE AT THIS LEVEL?: 40 MIN

## 2024-10-11 SDOH — HEALTH STABILITY: PHYSICAL HEALTH: ON AVERAGE, HOW MANY DAYS PER WEEK DO YOU ENGAGE IN MODERATE TO STRENUOUS EXERCISE (LIKE A BRISK WALK)?: 4 DAYS

## 2024-10-11 ASSESSMENT — SOCIAL DETERMINANTS OF HEALTH (SDOH): HOW OFTEN DO YOU GET TOGETHER WITH FRIENDS OR RELATIVES?: ONCE A WEEK

## 2024-10-28 ENCOUNTER — PATIENT OUTREACH (OUTPATIENT)
Dept: CARE COORDINATION | Facility: CLINIC | Age: 54
End: 2024-10-28
Payer: COMMERCIAL

## 2024-10-28 SDOH — HEALTH STABILITY: PHYSICAL HEALTH: ON AVERAGE, HOW MANY MINUTES DO YOU ENGAGE IN EXERCISE AT THIS LEVEL?: 40 MIN

## 2024-10-28 SDOH — HEALTH STABILITY: PHYSICAL HEALTH: ON AVERAGE, HOW MANY DAYS PER WEEK DO YOU ENGAGE IN MODERATE TO STRENUOUS EXERCISE (LIKE A BRISK WALK)?: 3 DAYS

## 2024-10-28 ASSESSMENT — SOCIAL DETERMINANTS OF HEALTH (SDOH): HOW OFTEN DO YOU GET TOGETHER WITH FRIENDS OR RELATIVES?: ONCE A WEEK

## 2024-10-29 ENCOUNTER — OFFICE VISIT (OUTPATIENT)
Dept: FAMILY MEDICINE | Facility: CLINIC | Age: 54
End: 2024-10-29
Payer: COMMERCIAL

## 2024-10-29 VITALS
WEIGHT: 155 LBS | OXYGEN SATURATION: 97 % | TEMPERATURE: 98.3 F | DIASTOLIC BLOOD PRESSURE: 68 MMHG | BODY MASS INDEX: 26.46 KG/M2 | HEART RATE: 72 BPM | SYSTOLIC BLOOD PRESSURE: 112 MMHG | HEIGHT: 64 IN | RESPIRATION RATE: 16 BRPM

## 2024-10-29 DIAGNOSIS — F41.1 GENERALIZED ANXIETY DISORDER: ICD-10-CM

## 2024-10-29 DIAGNOSIS — E78.2 MIXED HYPERLIPIDEMIA: ICD-10-CM

## 2024-10-29 DIAGNOSIS — Z23 ENCOUNTER FOR ADMINISTRATION OF VACCINE: ICD-10-CM

## 2024-10-29 DIAGNOSIS — Z00.00 ROUTINE GENERAL MEDICAL EXAMINATION AT A HEALTH CARE FACILITY: Primary | ICD-10-CM

## 2024-10-29 DIAGNOSIS — E66.3 OVERWEIGHT: ICD-10-CM

## 2024-10-29 DIAGNOSIS — Z12.31 VISIT FOR SCREENING MAMMOGRAM: ICD-10-CM

## 2024-10-29 LAB
ANION GAP SERPL CALCULATED.3IONS-SCNC: 10 MMOL/L (ref 7–15)
BUN SERPL-MCNC: 15.9 MG/DL (ref 6–20)
CALCIUM SERPL-MCNC: 9.9 MG/DL (ref 8.8–10.4)
CHLORIDE SERPL-SCNC: 103 MMOL/L (ref 98–107)
CHOLEST SERPL-MCNC: 263 MG/DL
CREAT SERPL-MCNC: 0.8 MG/DL (ref 0.51–0.95)
EGFRCR SERPLBLD CKD-EPI 2021: 88 ML/MIN/1.73M2
FASTING STATUS PATIENT QL REPORTED: YES
FASTING STATUS PATIENT QL REPORTED: YES
GLUCOSE SERPL-MCNC: 101 MG/DL (ref 70–99)
HCO3 SERPL-SCNC: 26 MMOL/L (ref 22–29)
HDLC SERPL-MCNC: 65 MG/DL
LDLC SERPL CALC-MCNC: 162 MG/DL
NONHDLC SERPL-MCNC: 198 MG/DL
POTASSIUM SERPL-SCNC: 4.8 MMOL/L (ref 3.4–5.3)
SODIUM SERPL-SCNC: 139 MMOL/L (ref 135–145)
TRIGL SERPL-MCNC: 182 MG/DL

## 2024-10-29 PROCEDURE — 80061 LIPID PANEL: CPT | Performed by: STUDENT IN AN ORGANIZED HEALTH CARE EDUCATION/TRAINING PROGRAM

## 2024-10-29 PROCEDURE — 99396 PREV VISIT EST AGE 40-64: CPT | Mod: 25 | Performed by: STUDENT IN AN ORGANIZED HEALTH CARE EDUCATION/TRAINING PROGRAM

## 2024-10-29 PROCEDURE — 80048 BASIC METABOLIC PNL TOTAL CA: CPT | Performed by: STUDENT IN AN ORGANIZED HEALTH CARE EDUCATION/TRAINING PROGRAM

## 2024-10-29 PROCEDURE — 91320 SARSCV2 VAC 30MCG TRS-SUC IM: CPT | Performed by: STUDENT IN AN ORGANIZED HEALTH CARE EDUCATION/TRAINING PROGRAM

## 2024-10-29 PROCEDURE — 90480 ADMN SARSCOV2 VAC 1/ONLY CMP: CPT | Performed by: STUDENT IN AN ORGANIZED HEALTH CARE EDUCATION/TRAINING PROGRAM

## 2024-10-29 PROCEDURE — 99214 OFFICE O/P EST MOD 30 MIN: CPT | Mod: 25 | Performed by: STUDENT IN AN ORGANIZED HEALTH CARE EDUCATION/TRAINING PROGRAM

## 2024-10-29 PROCEDURE — 36415 COLL VENOUS BLD VENIPUNCTURE: CPT | Performed by: STUDENT IN AN ORGANIZED HEALTH CARE EDUCATION/TRAINING PROGRAM

## 2024-10-29 RX ORDER — PHENTERMINE HYDROCHLORIDE 37.5 MG/1
TABLET ORAL
Qty: 28 TABLET | Refills: 0 | Status: SHIPPED | OUTPATIENT
Start: 2024-11-26

## 2024-10-29 RX ORDER — VENLAFAXINE HYDROCHLORIDE 37.5 MG/1
37.5 CAPSULE, EXTENDED RELEASE ORAL DAILY
Qty: 7 CAPSULE | Refills: 0 | Status: SHIPPED | OUTPATIENT
Start: 2024-10-29 | End: 2024-11-05

## 2024-10-29 RX ORDER — VENLAFAXINE HYDROCHLORIDE 75 MG/1
75 CAPSULE, EXTENDED RELEASE ORAL DAILY
Qty: 30 CAPSULE | Refills: 1 | Status: SHIPPED | OUTPATIENT
Start: 2024-11-05

## 2024-10-29 RX ORDER — PROPRANOLOL HCL 20 MG
20 TABLET ORAL 3 TIMES DAILY PRN
Qty: 90 TABLET | Refills: 11 | Status: SHIPPED | OUTPATIENT
Start: 2024-10-29

## 2024-10-29 NOTE — PROGRESS NOTES
"Preventive Care Visit  [facility] CRISTIAN Rodriguez MD, Family Medicine  Oct 29, 2024      Assessment & Plan     Routine general medical examination at a health care facility    - Basic metabolic panel  (Ca, Cl, CO2, Creat, Gluc, K, Na, BUN); Future  - Basic metabolic panel  (Ca, Cl, CO2, Creat, Gluc, K, Na, BUN)    Generalized anxiety disorder  Uncontrolled.  Decrease escitalopram from 10 mg to 5 mg daily x 7 days then stop  - venlafaxine (EFFEXOR XR) 37.5 MG 24 hr capsule; Take 1 capsule (37.5 mg) by mouth daily for 7 days.  - venlafaxine (EFFEXOR XR) 75 MG 24 hr capsule; Take 1 capsule (75 mg) by mouth daily.  - propranolol (INDERAL) 20 MG tablet; Take 1 tablet (20 mg) by mouth 3 times daily as needed (anxiety).    Mixed hyperlipidemia  Stable, managed with lifestyle modification  - Lipid panel reflex to direct LDL Fasting; Future  - Lipid panel reflex to direct LDL Fasting    Encounter for administration of vaccine    - COVID-19 12+ (PFIZER)    Visit for screening mammogram    - MA Screening Bilateral w/ Syed; Future    Overweight  Unstable, side effects of medication discussed with patient.  I advised patient not to start with medication until anxiety is stable.  - phentermine (ADIPEX-P) 37.5 MG tablet; Half a tab before breakfast daily for 7 days then 1 tabs daily thereafter  Follow up in 1 month  Patient has been advised of split billing requirements and indicates understanding: Yes        BMI  Estimated body mass index is 27.03 kg/m  as calculated from the following:    Height as of this encounter: 1.613 m (5' 3.5\").    Weight as of this encounter: 70.3 kg (155 lb).   Weight management plan: Discussed healthy diet and exercise guidelines    Counseling  Appropriate preventive services were addressed with this patient via screening, questionnaire, or discussion as appropriate for fall prevention, nutrition, physical activity, Tobacco-use cessation, social engagement, weight loss " and cognition.  Checklist reviewing preventive services available has been given to the patient.  Reviewed patient's diet, addressing concerns and/or questions.   She is at risk for lack of exercise and has been provided with information to increase physical activity for the benefit of her well-being.           Viktor Alanis is a 54 year old, presenting for the following:  Physical        10/29/2024     8:01 AM   Additional Questions   Roomed by Diana HOUGH         10/29/2024     8:01 AM   Patient Reported Additional Medications   Patient reports taking the following new medications None          HPI  The patient has a history of anxiety and is currently taking escitalopram. She also uses propranolol as needed for social anxiety. There are many changes happening at work, and she is experiencing vasomotor symptoms related to menopause, often waking up drenched in sweat. Additionally, she is struggling with weight gain and would like to try weight loss medication. She denies any family history of sudden cardiac death and has no personal history of hypertension or heart disease.    Health Care Directive  Patient does not have a Health Care Directive: Discussed advance care planning with patient; however, patient declined at this time.      10/28/2024   General Health   How would you rate your overall physical health? Good   Feel stress (tense, anxious, or unable to sleep) To some extent      (!) STRESS CONCERN      10/28/2024   Nutrition   Three or more servings of calcium each day? Yes   Diet: Regular (no restrictions)   How many servings of fruit and vegetables per day? (!) 2-3   How many sweetened beverages each day? 0-1            10/28/2024   Exercise   Days per week of moderate/strenous exercise 3 days   Average minutes spent exercising at this level 40 min            10/28/2024   Social Factors   Frequency of gathering with friends or relatives Once a week   Worry food won't last until get money to buy  more No   Food not last or not have enough money for food? No   Do you have housing? (Housing is defined as stable permanent housing and does not include staying ouside in a car, in a tent, in an abandoned building, in an overnight shelter, or couch-surfing.) Yes   Are you worried about losing your housing? No   Lack of transportation? No   Unable to get utilities (heat,electricity)? No            10/28/2024   Fall Risk   Fallen 2 or more times in the past year? No    Trouble with walking or balance? No        Patient-reported          10/28/2024   Dental   Dentist two times every year? Yes            10/11/2024   TB Screening   Were you born outside of the [place]? No            Today's PHQ-2 Score:       10/15/2024     9:19 AM   PHQ-2 ( 1999 Pf[employer])   Q1: Little interest or pleasure in doing things 1    Q2: Feeling down, depressed or hopeless 0    PHQ-2 Score 1    1   Q1: Little interest or pleasure in doing things Several days   Q2: Feeling down, depressed or hopeless Not at all   PHQ-2 Score 1       Patient-reported    Multiple values from one day are sorted in reverse-chronological order           10/28/2024   Substance Use   Alcohol more than 3/day or more than 7/wk No   Do you use any other substances recreationally? No        Social History     Tobacco Use    Smoking status: Never    Smokeless tobacco: Never   Substance Use Topics    Alcohol use: Yes     Comment: occ. 2 drinks a week    Drug use: No         10/30/2023   LAST FHS-7 RESULTS   1st degree relative breast or ovarian cancer No   Any relative bilateral breast cancer No   Any male have breast cancer No   Any ONE woman have BOTH breast AND ovarian cancer No   Any woman with breast cancer before 50yrs No   2 or more relatives with breast AND/OR ovarian cancer No   2 or more relatives with breast AND/OR bowel cancer No          Mammogram Screening - Mammogram every 1-2 years updated in Health Maintenance based on mutual decision making        10/28/2024  "  STI Screening   New sexual partner(s) since last STI/HIV test? No        History of abnormal Pap smear:        ASCVD Risk   The 10-year ASCVD risk score (Jc MORRIS, et al., 2019) is: 1.7%    Values used to calculate the score:      Age: 54 years      Sex: Female      Is Non- : No      Diabetic: No      Tobacco smoker: No      Systolic Blood Pressure: 112 mmHg      Is BP treated: No      HDL Cholesterol: 62 mg/dL      Total Cholesterol: 281 mg/dL          Reviewed and updated as needed this visit by Provider      Past Medical History:   Diagnosis Date    Chronic sinusitis      Past Surgical History:   Procedure Laterality Date    COLONOSCOPY N/A 8/27/2021    Procedure: Colonoscopy, Flexible, With Lesion Removal Using Snare;  Surgeon: Randy Mchugh MD;  Location: [facility] OR    COLONOSCOPY WITH CO2 INSUFFLATION N/A 8/27/2021    Procedure: COLONOSCOPY, WITH CO2 INSUFFLATION;  Surgeon: Randy Mchugh MD;  Location: [facility]    GENITOURINARY SURGERY      c section x 2    HYSTERECTOMY TOTAL ABDOMINAL      HYSTERECTOMY, PAP NO LONGER INDICATED      OPTICAL TRACKING SYSTEM ENDOSCOPIC SINUS SURGERY Bilateral 08/26/2016    Procedure: OPTICAL TRACKING SYSTEM ENDOSCOPIC SINUS SURGERY;  Surgeon: Goivnd Chester MD;  Location: [facility] OR    SINUS SURGERY      sinus x 4    SURGICAL HISTORY OF -   04/10/2008    SINUS SURGERY    TONSILLECTOMY       OB History   Obstetric Comments   2          Review of Systems  Constitutional, HEENT, cardiovascular, pulmonary, GI, , musculoskeletal, neuro, skin, endocrine and psych systems are negative, except as otherwise noted.     Objective    Exam  /68   Pulse 72   Temp 98.3  F (36.8  C) (Oral)   Resp 16   Ht 1.613 m (5' 3.5\")   Wt 70.3 kg (155 lb)   SpO2 97%   BMI 27.03 kg/m     Estimated body mass index is 27.03 kg/m  as calculated from the following:    Height as of this encounter: 1.613 m (5' 3.5\").    Weight as of this " encounter: 70.3 kg (155 lb).    Physical Exam  GENERAL: alert and no distress  EYES: Eyes grossly normal to inspection, PERRL and conjunctivae and sclerae normal  HENT: ear canals and TM's normal, nose and mouth without ulcers or lesions  NECK: no adenopathy, no asymmetry, masses, or scars  RESP: lungs clear to auscultation - no rales, rhonchi or wheezes  CV: regular rate and rhythm, normal S1 S2, no S3 or S4, no murmur, click or rub, no peripheral edema  ABDOMEN: soft, nontender, no hepatosplenomegaly, no masses and bowel sounds normal  MS: no gross musculoskeletal defects noted, no edema  SKIN: no suspicious lesions or rashes  NEURO: Normal strength and tone, mentation intact and speech normal  PSYCH: mentation appears normal, affect normal/bright        Signed Electronically by: Rebeca Rodriguez MD

## 2024-10-29 NOTE — PATIENT INSTRUCTIONS
Patient Education   Preventive Care Advice   This is general advice given by our system to help you stay healthy. However, your care team may have specific advice just for you. Please talk to your care team about your preventive care needs.  Nutrition  Eat 5 or more servings of fruits and vegetables each day.  Try wheat bread, brown rice and whole grain pasta (instead of white bread, rice, and pasta).  Get enough calcium and vitamin D. Check the label on foods and aim for 100% of the RDA (recommended daily allowance).  Lifestyle  Exercise at least 150 minutes each week  (30 minutes a day, 5 days a week).  Do muscle strengthening activities 2 days a week. These help control your weight and prevent disease.  No smoking.  Wear sunscreen to prevent skin cancer.  Have a dental exam and cleaning every 6 months.  Yearly exams  See your health care team every year to talk about:  Any changes in your health.  Any medicines your care team has prescribed.  Preventive care, family planning, and ways to prevent chronic diseases.  Shots (vaccines)   HPV shots (up to age 26), if you've never had them before.  Hepatitis B shots (up to age 59), if you've never had them before.  COVID-19 shot: Get this shot when it's due.  Flu shot: Get a flu shot every year.  Tetanus shot: Get a tetanus shot every 10 years.  Pneumococcal, hepatitis A, and RSV shots: Ask your care team if you need these based on your risk.  Shingles shot (for age 50 and up)  General health tests  Diabetes screening:  Starting at age 35, Get screened for diabetes at least every 3 years.  If you are younger than age 35, ask your care team if you should be screened for diabetes.  Cholesterol test: At age 39, start having a cholesterol test every 5 years, or more often if advised.  Bone density scan (DEXA): At age 50, ask your care team if you should have this scan for osteoporosis (brittle bones).  Hepatitis C: Get tested at least once in your life.  STIs (sexually  transmitted infections)  Before age 24: Ask your care team if you should be screened for STIs.  After age 24: Get screened for STIs if you're at risk. You are at risk for STIs (including HIV) if:  You are sexually active with more than one person.  You don't use condoms every time.  You or a partner was diagnosed with a sexually transmitted infection.  If you are at risk for HIV, ask about PrEP medicine to prevent HIV.  Get tested for HIV at least once in your life, whether you are at risk for HIV or not.  Cancer screening tests  Cervical cancer screening: If you have a cervix, begin getting regular cervical cancer screening tests starting at age 21.  Breast cancer scan (mammogram): If you've ever had breasts, begin having regular mammograms starting at age 40. This is a scan to check for breast cancer.  Colon cancer screening: It is important to start screening for colon cancer at age 45.  Have a colonoscopy test every 10 years (or more often if you're at risk) Or, ask your provider about stool tests like a FIT test every year or Cologuard test every 3 years.  To learn more about your testing options, visit:   .  For help making a decision, visit:   https://bit.ly/jg27516.  Prostate cancer screening test: If you have a prostate, ask your care team if a prostate cancer screening test (PSA) at age 55 is right for you.  Lung cancer screening: If you are a current or former smoker ages 50 to 80, ask your care team if ongoing lung cancer screenings are right for you.  For informational purposes only. Not to replace the advice of your health care provider. Copyright   2023 [employer]. All rights reserved. Clinically reviewed by the [facility] Transitions Program. [employer] [id number] - REV 01/24.

## 2024-10-30 ENCOUNTER — TELEPHONE (OUTPATIENT)
Dept: FAMILY MEDICINE | Facility: CLINIC | Age: 54
End: 2024-10-30
Payer: COMMERCIAL

## 2024-10-30 NOTE — TELEPHONE ENCOUNTER
Prior Authorization Retail Medication Request    Medication/Dose: phentermine (ADIPEX-P) 37.5 MG tablet  Diagnosis and ICD code (if different than what is on RX):    Overweight [E66.3]     New/renewal/insurance change PA/secondary ins. PA:  Previously Tried and Failed:    Rationale:      Insurance   Primary: MEDICA CHOICE   Insurance ID:  [id number]     Secondary (if applicable):  Insurance ID:      Pharmacy Information (if different than what is on RX)  Name:    Phone:    Fax:    Clinic Information  Preferred routing pool for dept communication:

## 2024-10-31 NOTE — TELEPHONE ENCOUNTER
Retail Pharmacy Prior Authorization Team   Phone: [phone]    PA Initiation    Medication: PHENTERMINE HCL 37.5 MG PO TABS  Insurance Company: [employer] ([employer]) - Phone [phone] Fax [phone]  Pharm[facility] the Rx[id number] [place] [address] [address] [facility] [address]  Filling Pharmacy Phone: [phone]  Filling Pharmacy Fax:    Start Date: 10/31/2024

## 2024-10-31 NOTE — TELEPHONE ENCOUNTER
PRIOR AUTHORIZATION DENIED    Medication: PHENTERMINE HCL 37.5 MG PO TABS  Insurance Company: Paid/[employer] ([employer]) - Phone [phone] Fax [phone]  Denial Date: 10/31/2024  Denial Rational:         Appeal Information:   If provider would like to appeal please review the plan's reasons for denial listed above. Please utilize that information to complete letter and provide specific, detailed clinical information/rationale of your patient's health status to address their denial reasons.      Patient Notified: No

## 2024-11-01 DIAGNOSIS — E78.2 MIXED HYPERLIPIDEMIA: Primary | ICD-10-CM

## 2024-11-02 DIAGNOSIS — F41.1 GENERALIZED ANXIETY DISORDER: ICD-10-CM

## 2024-11-04 DIAGNOSIS — F41.1 GENERALIZED ANXIETY DISORDER: ICD-10-CM

## 2024-11-04 RX ORDER — VENLAFAXINE HYDROCHLORIDE 37.5 MG/1
CAPSULE, EXTENDED RELEASE ORAL
Qty: 7 CAPSULE | Refills: 0 | OUTPATIENT
Start: 2024-11-04

## 2024-11-04 RX ORDER — ESCITALOPRAM OXALATE 10 MG/1
10 TABLET ORAL DAILY
Qty: 30 TABLET | Refills: 0 | OUTPATIENT
Start: 2024-11-04

## 2024-12-04 ENCOUNTER — APPOINTMENT (OUTPATIENT)
Dept: URBAN - METROPOLITAN AREA CLINIC 252 | Age: 54
Setting detail: DERMATOLOGY
End: 2024-12-04

## 2024-12-04 VITALS — WEIGHT: 135 LBS | HEIGHT: 64 IN

## 2024-12-04 DIAGNOSIS — Z41.9 ENCOUNTER FOR PROCEDURE FOR PURPOSES OTHER THAN REMEDYING HEALTH STATE, UNSPECIFIED: ICD-10-CM

## 2024-12-04 PROCEDURE — OTHER BOTOX: OTHER

## 2024-12-04 NOTE — PROCEDURE: BOTOX
Expiration Date (Month Year): 2025/09
Detail Level: Detailed
Consent: COLOR KEY FOR DIAGRAM BELOW:\\nRed = 1/2 unit of Jeuveau per injection\\nOrange = 1 units of Jeuveau per injection\\nYellow = 2 units of Jeuveau per injection\\nGreen = 3 units of Jeuveau per injection\\nBlue = 4 units of Jeuveau per injection\\nBrown = 5 units of Jeuveau per injection\\n\\nWritten consent obtained. Risks include but not limited to lid/brow ptosis, bruising, swelling, diplopia, temporary effect, incomplete chemical denervation.
Show Inventory Tab: Show
Total Units: 33
Map Statement: Please see attached map for locations and injection amounts.
Post-Care Instructions: Patient instructed to not lie down for 4 hours and limit physical activity for 24 hours.
Lot #: [id number]
Price (Use Numbers Only, No Special Characters Or $): 735

## 2024-12-28 DIAGNOSIS — F41.1 GENERALIZED ANXIETY DISORDER: ICD-10-CM

## 2024-12-29 RX ORDER — VENLAFAXINE HYDROCHLORIDE 75 MG/1
75 CAPSULE, EXTENDED RELEASE ORAL DAILY
Qty: 90 CAPSULE | Refills: 2 | Status: SHIPPED | OUTPATIENT
Start: 2024-12-29

## 2025-01-07 DIAGNOSIS — N30.00 ACUTE CYSTITIS WITHOUT HEMATURIA: Primary | ICD-10-CM

## 2025-01-07 RX ORDER — NITROFURANTOIN 25; 75 MG/1; MG/1
100 CAPSULE ORAL 2 TIMES DAILY
Qty: 10 CAPSULE | Refills: 0 | Status: SHIPPED | OUTPATIENT
Start: 2025-01-07 | End: 2025-01-12

## 2025-01-17 ENCOUNTER — ANCILLARY PROCEDURE (OUTPATIENT)
Dept: MAMMOGRAPHY | Facility: CLINIC | Age: 55
End: 2025-01-17
Attending: STUDENT IN AN ORGANIZED HEALTH CARE EDUCATION/TRAINING PROGRAM
Payer: COMMERCIAL

## 2025-01-17 DIAGNOSIS — Z12.31 VISIT FOR SCREENING MAMMOGRAM: ICD-10-CM

## 2025-01-17 PROCEDURE — 77067 SCR MAMMO BI INCL CAD: CPT | Mod: TC | Performed by: RADIOLOGY

## 2025-01-17 PROCEDURE — 77063 BREAST TOMOSYNTHESIS BI: CPT | Mod: TC | Performed by: RADIOLOGY

## 2025-03-05 ENCOUNTER — APPOINTMENT (OUTPATIENT)
Dept: URBAN - METROPOLITAN AREA CLINIC 252 | Age: 55
Setting detail: DERMATOLOGY
End: 2025-03-05

## 2025-03-05 DIAGNOSIS — Z41.9 ENCOUNTER FOR PROCEDURE FOR PURPOSES OTHER THAN REMEDYING HEALTH STATE, UNSPECIFIED: ICD-10-CM

## 2025-03-05 PROCEDURE — OTHER BOTOX: OTHER

## 2025-03-05 NOTE — PROCEDURE: BOTOX
Map Statement: Please see attached map for locations and injection amounts.
Show Inventory Tab: Show
Consent: COLOR KEY FOR DIAGRAM BELOW:\\nRed = 1/2 unit of Botox per injection\\nOrange = 1 units of Botox per injection\\nYellow = 2 units of Botox per injection\\nGreen = 3 units of Botox per injection\\nBlue = 4 units of Botox  per injection\\nBrown = 5 units of Botox  per injection\\n\\nWritten consent obtained. Risks include but not limited to lid/brow ptosis, bruising, swelling, diplopia, temporary effect, incomplete chemical denervation.
Post-Care Instructions: Patient instructed to not lie down for 4 hours and limit physical activity for 24 hours.
Expiration Date (Month Year): 2025/09
Detail Level: Detailed
Total Units: 33
Price (Use Numbers Only, No Special Characters Or $): 077
Lot #: [id number]

## 2025-05-15 ENCOUNTER — MYC REFILL (OUTPATIENT)
Dept: FAMILY MEDICINE | Facility: CLINIC | Age: 55
End: 2025-05-15
Payer: COMMERCIAL

## 2025-05-15 DIAGNOSIS — E66.3 OVERWEIGHT: ICD-10-CM

## 2025-05-19 RX ORDER — PHENTERMINE HYDROCHLORIDE 37.5 MG/1
TABLET ORAL
Qty: 28 TABLET | Refills: 0 | Status: SHIPPED | OUTPATIENT
Start: 2025-05-19

## 2025-05-19 RX ORDER — PHENTERMINE HYDROCHLORIDE 37.5 MG/1
TABLET ORAL
Qty: 28 TABLET | Refills: 0 | OUTPATIENT
Start: 2025-05-19

## 2025-05-20 ENCOUNTER — TELEPHONE (OUTPATIENT)
Dept: FAMILY MEDICINE | Facility: CLINIC | Age: 55
End: 2025-05-20
Payer: COMMERCIAL

## 2025-05-20 NOTE — TELEPHONE ENCOUNTER
PRIOR AUTHORIZATION DENIED    Medication: PHENTERMINE HCL 37.5 MG PO TABS  Insurance Company: [employer] Non-Specialty PA's - Phone [phone] Fax [phone]  Denial Date: 5/20/2025  Denial Reason(s): Criteria not met        Appeal Information:   Patient Notified: No

## 2025-06-18 ENCOUNTER — APPOINTMENT (OUTPATIENT)
Dept: URBAN - METROPOLITAN AREA CLINIC 252 | Age: 55
Setting detail: DERMATOLOGY
End: 2025-06-18

## 2025-06-18 DIAGNOSIS — Z41.9 ENCOUNTER FOR PROCEDURE FOR PURPOSES OTHER THAN REMEDYING HEALTH STATE, UNSPECIFIED: ICD-10-CM

## 2025-06-18 PROCEDURE — OTHER BOTOX: OTHER

## 2025-06-24 ENCOUNTER — MYC REFILL (OUTPATIENT)
Dept: FAMILY MEDICINE | Facility: CLINIC | Age: 55
End: 2025-06-24
Payer: COMMERCIAL

## 2025-06-24 DIAGNOSIS — E66.3 OVERWEIGHT: ICD-10-CM

## 2025-06-24 RX ORDER — PHENTERMINE HYDROCHLORIDE 37.5 MG/1
TABLET ORAL
Qty: 28 TABLET | OUTPATIENT
Start: 2025-06-24

## 2025-06-24 RX ORDER — PHENTERMINE HYDROCHLORIDE 37.5 MG/1
TABLET ORAL
Qty: 28 TABLET | Refills: 0 | OUTPATIENT
Start: 2025-06-24

## 2025-07-28 ENCOUNTER — VIRTUAL VISIT (OUTPATIENT)
Dept: FAMILY MEDICINE | Facility: CLINIC | Age: 55
End: 2025-07-28
Payer: COMMERCIAL

## 2025-07-28 DIAGNOSIS — E78.2 MIXED HYPERLIPIDEMIA: ICD-10-CM

## 2025-07-28 DIAGNOSIS — E66.3 OVERWEIGHT: ICD-10-CM

## 2025-07-28 DIAGNOSIS — N95.1 MENOPAUSAL SYNDROME (HOT FLASHES): Primary | ICD-10-CM

## 2025-07-28 PROCEDURE — 98006 SYNCH AUDIO-VIDEO EST MOD 30: CPT | Performed by: STUDENT IN AN ORGANIZED HEALTH CARE EDUCATION/TRAINING PROGRAM

## 2025-07-28 RX ORDER — TOPIRAMATE 25 MG/1
TABLET, FILM COATED ORAL
Qty: 69 TABLET | Refills: 0 | Status: SHIPPED | OUTPATIENT
Start: 2025-07-28 | End: 2025-07-28

## 2025-07-28 RX ORDER — TOPIRAMATE 25 MG/1
75 TABLET, FILM COATED ORAL AT BEDTIME
Qty: 270 TABLET | Refills: 0 | OUTPATIENT
Start: 2025-07-28

## 2025-07-28 RX ORDER — PHENTERMINE HYDROCHLORIDE 37.5 MG/1
37.5 TABLET ORAL
Qty: 30 TABLET | Refills: 0 | Status: SHIPPED | OUTPATIENT
Start: 2025-07-28

## 2025-07-28 RX ORDER — TOPIRAMATE 25 MG/1
75 TABLET, FILM COATED ORAL AT BEDTIME
Qty: 90 TABLET | Refills: 2 | Status: SHIPPED | OUTPATIENT
Start: 2025-07-28

## 2025-07-28 RX ORDER — TOPIRAMATE 25 MG/1
TABLET, FILM COATED ORAL
Qty: 230 TABLET | Refills: 0 | Status: SHIPPED | OUTPATIENT
Start: 2025-07-28

## 2025-07-28 RX ORDER — ESTRADIOL 0.03 MG/D
1 FILM, EXTENDED RELEASE TRANSDERMAL
Qty: 24 PATCH | Refills: 3 | Status: SHIPPED | OUTPATIENT
Start: 2025-07-28

## 2025-07-28 NOTE — PROGRESS NOTES
"Annabelle is a 54 year old who is being evaluated via a billable video visit.    How would you like to obtain your AVS? MyChart  If the video visit is dropped, the invitation should be resent by: Text to cell phone: [phone]  Will anyone else be joining your video visit? No      Assessment & Plan     Overweight  Improving. PDMP checked  - topiramate (TOPAMAX) 25 MG tablet; Take 1 tablet (25 mg) by mouth at bedtime for 7 days, THEN 2 tablets (50 mg) at bedtime for 7 days, THEN 3 tablets (75 mg) at bedtime for 16 days.  - topiramate (TOPAMAX) 25 MG tablet; Take 3 tablets (75 mg) by mouth at bedtime.  - phentermine (ADIPEX-P) 37.5 MG tablet; Take 1 tablet (37.5 mg) by mouth every morning (before breakfast).    Menopausal syndrome (hot flashes)  uncontrolled  - estradiol (VIVELLE-DOT) 0.025 MG/24HR bi-weekly patch; Place 1 patch onto the skin twice a week  We discussed the risks and benefits of hormone replacement therapy and the Women's Health Initiative. Including discussion of increased risk of stroke, heart attack, and coronary artery disease in patients with a personal history significant for chronic hypertension, hypercholesterolemia, or strong family history of coronary artery disease. We discussed current reccomendations for prescribing estrogen for symptomatic relief of hot flashes on a temporary basis. Discussed goal of being on hormonal replacement medication is for shortest duration needed and at lowest dose that can manage symptoms. We discussed a slightly increased risk of breast cancer.   Mixed hyperlipidemia  Unstable, recommend rechecking lab    BMI  Estimated body mass index is 27.03 kg/m  as calculated from the following:    Height as of 10/29/24: 1.613 m (5' 3.5\").    Weight as of 10/29/24: 70.3 kg (155 lb).   Weight management plan: Discussed healthy diet and exercise guidelines        The longitudinal plan of care for the diagnosis(es)/condition(s) as documented were addressed during this visit. " Due to the added complexity in care, I will continue to support Annabelle in the subsequent management and with ongoing continuity of care.    Viktor Alanis is a 54 year old, presenting for the following health issues:  Weight Management      7/28/2025     7:50 AM   Additional Questions   Roomed by Patient completed echeck in via [employer]     History of Present Illness       She eats 2-3 servings of fruits and vegetables daily.She consumes 1 sweetened beverage(s) daily.She exercises with enough effort to increase her heart rate 20 to 29 minutes per day.  She exercises with enough effort to increase her heart rate 3 or less days per week.   She is taking medications regularly.      Weight Management           7/27/2025   Return Weight Management Questionnaire   I have made the following changes to my diet since my last visit Smaller portions;Less snacking between meals;Nutritious food choices   With regards to my diet, I am still struggling with Extra snacking between meals;Eating more than I wanted to;Eating when I am full;Eating when I feel stressed or upset   I have made the following changes to my activity/exercise since my last visit Taking more walks;Increased my steps per day;Intentionally taking the stairs   With regards to my activity/exercise, I am still struggling with Lack of time;Feeling too tired to exercise;Worried people will look at me   Are you taking your weight loss medication as prescribed I am not prescribed a medication for weight loss            Primary Care Weight Management History            No data to display                    7/27/2025   PHQ-2 Total Score   PHQ-2 Total Score 1        Patient-reported       Annabelle Greene is a 54-year-old female presenting with persistent menopausal symptoms and concerns regarding weight management. She reports experiencing hot flashes and excessive sweating, especially at night and intermittently throughout the day, ongoing for at least 6 months.  The sweating primarily affects her back and is described as constant, with sudden episodes of feeling very hot. She has tried over-the-counter estrogen pills without relief of symptoms. She has a history of hysterectomy with ovaries preserved. She began phentermine (Adipex) for weight loss approximately 1-2 months ago and reports some success, though she only started it recently. She is interested in continuing weight loss efforts and is open to medication adjustments as needed. She denies any personal history of blood clots,CAD, liver disease, kidney stones, seizures, glaucoma, or migraines with aura. She has hyperlipidemia.    Review of Systems  Constitutional, neuro, ENT, endocrine, pulmonary, cardiac, gastrointestinal, genitourinary, musculoskeletal, integument and psychiatric systems are negative, except as otherwise noted.      Objective           Vitals:  No vitals were obtained today due to virtual visit.    Physical Exam   GENERAL: alert and no distress  EYES: Eyes grossly normal to inspection.  No discharge or erythema, or obvious scleral/conjunctival abnormalities.  RESP: No audible wheeze, cough, or visible cyanosis.    SKIN: Visible skin clear. No significant rash, abnormal pigmentation or lesions.  PSYCH: Appropriate affect, tone, and pace of words          Video-Visit Details    Type of service:  Video Visit   Originating Location (pt. Location): Home    Distant Location (provider location):  On-site  Platform used for Video Visit: Julio Cesar  Signed Electronically by: Rebeca Rodriguez MD

## 2025-08-18 ENCOUNTER — MYC REFILL (OUTPATIENT)
Dept: FAMILY MEDICINE | Facility: CLINIC | Age: 55
End: 2025-08-18
Payer: COMMERCIAL

## 2025-08-18 DIAGNOSIS — E66.3 OVERWEIGHT: ICD-10-CM

## 2025-08-19 RX ORDER — PHENTERMINE HYDROCHLORIDE 37.5 MG/1
37.5 TABLET ORAL
Qty: 30 TABLET | Refills: 0 | Status: SHIPPED | OUTPATIENT
Start: 2025-08-28

## 2025-08-20 DIAGNOSIS — E66.3 OVERWEIGHT: ICD-10-CM

## 2025-08-20 RX ORDER — TOPIRAMATE 25 MG/1
TABLET, FILM COATED ORAL
Qty: 207 TABLET | OUTPATIENT
Start: 2025-08-20

## 2025-09-02 ENCOUNTER — OFFICE VISIT (OUTPATIENT)
Dept: FAMILY MEDICINE | Facility: CLINIC | Age: 55
End: 2025-09-02
Payer: COMMERCIAL

## 2025-09-02 VITALS
HEIGHT: 63 IN | SYSTOLIC BLOOD PRESSURE: 126 MMHG | RESPIRATION RATE: 16 BRPM | BODY MASS INDEX: 27.29 KG/M2 | OXYGEN SATURATION: 100 % | HEART RATE: 93 BPM | DIASTOLIC BLOOD PRESSURE: 82 MMHG | WEIGHT: 154 LBS | TEMPERATURE: 96.8 F

## 2025-09-02 DIAGNOSIS — Z23 ENCOUNTER FOR ADMINISTRATION OF VACCINE: ICD-10-CM

## 2025-09-02 DIAGNOSIS — Z00.00 ROUTINE GENERAL MEDICAL EXAMINATION AT A HEALTH CARE FACILITY: Primary | ICD-10-CM

## 2025-09-02 DIAGNOSIS — E66.3 OVERWEIGHT: ICD-10-CM

## 2025-09-02 DIAGNOSIS — Z13.1 SCREENING FOR DIABETES MELLITUS: ICD-10-CM

## 2025-09-02 DIAGNOSIS — Z12.31 ENCOUNTER FOR SCREENING MAMMOGRAM FOR BREAST CANCER: ICD-10-CM

## 2025-09-02 DIAGNOSIS — E78.2 MIXED HYPERLIPIDEMIA: ICD-10-CM

## 2025-09-02 DIAGNOSIS — F41.1 GENERALIZED ANXIETY DISORDER: ICD-10-CM

## 2025-09-02 PROCEDURE — 99396 PREV VISIT EST AGE 40-64: CPT | Performed by: STUDENT IN AN ORGANIZED HEALTH CARE EDUCATION/TRAINING PROGRAM

## 2025-09-02 PROCEDURE — 3079F DIAST BP 80-89 MM HG: CPT | Performed by: STUDENT IN AN ORGANIZED HEALTH CARE EDUCATION/TRAINING PROGRAM

## 2025-09-02 PROCEDURE — 99214 OFFICE O/P EST MOD 30 MIN: CPT | Mod: 25 | Performed by: STUDENT IN AN ORGANIZED HEALTH CARE EDUCATION/TRAINING PROGRAM

## 2025-09-02 PROCEDURE — 3074F SYST BP LT 130 MM HG: CPT | Performed by: STUDENT IN AN ORGANIZED HEALTH CARE EDUCATION/TRAINING PROGRAM

## 2025-09-02 PROCEDURE — G2211 COMPLEX E/M VISIT ADD ON: HCPCS | Performed by: STUDENT IN AN ORGANIZED HEALTH CARE EDUCATION/TRAINING PROGRAM

## 2025-09-02 RX ORDER — PROPRANOLOL HCL 20 MG
20 TABLET ORAL 3 TIMES DAILY PRN
Qty: 90 TABLET | Refills: 11 | Status: SHIPPED | OUTPATIENT
Start: 2025-09-02

## 2025-09-02 RX ORDER — VENLAFAXINE HYDROCHLORIDE 75 MG/1
75 CAPSULE, EXTENDED RELEASE ORAL DAILY
Qty: 90 CAPSULE | Refills: 4 | Status: SHIPPED | OUTPATIENT
Start: 2025-09-02

## 2025-09-02 SDOH — HEALTH STABILITY: PHYSICAL HEALTH: ON AVERAGE, HOW MANY DAYS PER WEEK DO YOU ENGAGE IN MODERATE TO STRENUOUS EXERCISE (LIKE A BRISK WALK)?: 2 DAYS

## 2025-09-02 SDOH — HEALTH STABILITY: PHYSICAL HEALTH: ON AVERAGE, HOW MANY MINUTES DO YOU ENGAGE IN EXERCISE AT THIS LEVEL?: 40 MIN

## 2025-09-02 ASSESSMENT — ANXIETY QUESTIONNAIRES
5. BEING SO RESTLESS THAT IT IS HARD TO SIT STILL: NOT AT ALL
6. BECOMING EASILY ANNOYED OR IRRITABLE: SEVERAL DAYS
3. WORRYING TOO MUCH ABOUT DIFFERENT THINGS: SEVERAL DAYS
GAD7 TOTAL SCORE: 3
IF YOU CHECKED OFF ANY PROBLEMS ON THIS QUESTIONNAIRE, HOW DIFFICULT HAVE THESE PROBLEMS MADE IT FOR YOU TO DO YOUR WORK, TAKE CARE OF THINGS AT HOME, OR GET ALONG WITH OTHER PEOPLE: NOT DIFFICULT AT ALL
8. IF YOU CHECKED OFF ANY PROBLEMS, HOW DIFFICULT HAVE THESE MADE IT FOR YOU TO DO YOUR WORK, TAKE CARE OF THINGS AT HOME, OR GET ALONG WITH OTHER PEOPLE?: NOT DIFFICULT AT ALL
GAD7 TOTAL SCORE: 3
2. NOT BEING ABLE TO STOP OR CONTROL WORRYING: NOT AT ALL
1. FEELING NERVOUS, ANXIOUS, OR ON EDGE: SEVERAL DAYS
7. FEELING AFRAID AS IF SOMETHING AWFUL MIGHT HAPPEN: NOT AT ALL
4. TROUBLE RELAXING: NOT AT ALL
GAD7 TOTAL SCORE: 3
7. FEELING AFRAID AS IF SOMETHING AWFUL MIGHT HAPPEN: NOT AT ALL

## (undated) DEVICE — KIT ENDO FIRST STEP DISINFECTANT 200ML W/POUCH EP-4

## (undated) DEVICE — PAD CHUX UNDERPAD 23X24" 7136

## (undated) DEVICE — PREP CHLORAPREP 26ML TINTED ORANGE  260815

## (undated) RX ORDER — FENTANYL CITRATE 50 UG/ML
INJECTION, SOLUTION INTRAMUSCULAR; INTRAVENOUS
Status: DISPENSED
Start: 2021-08-27

## (undated) RX ORDER — LIDOCAINE HYDROCHLORIDE AND EPINEPHRINE 10; 10 MG/ML; UG/ML
INJECTION, SOLUTION INFILTRATION; PERINEURAL
Status: DISPENSED
Start: 2019-03-03

## (undated) RX ORDER — ONDANSETRON 2 MG/ML
INJECTION INTRAMUSCULAR; INTRAVENOUS
Status: DISPENSED
Start: 2021-08-27

## (undated) RX ORDER — NEOMYCIN/BACITRACIN/POLYMYXINB 3.5-400-5K
OINTMENT (GRAM) TOPICAL
Status: DISPENSED
Start: 2019-03-03